# Patient Record
Sex: MALE | Race: ASIAN | NOT HISPANIC OR LATINO | Employment: FULL TIME | URBAN - METROPOLITAN AREA
[De-identification: names, ages, dates, MRNs, and addresses within clinical notes are randomized per-mention and may not be internally consistent; named-entity substitution may affect disease eponyms.]

---

## 2017-05-14 ENCOUNTER — APPOINTMENT (OUTPATIENT)
Dept: CT IMAGING | Facility: CLINIC | Age: 37
End: 2017-05-14
Attending: PHYSICIAN ASSISTANT
Payer: COMMERCIAL

## 2017-05-14 ENCOUNTER — HOSPITAL ENCOUNTER (EMERGENCY)
Facility: CLINIC | Age: 37
Discharge: HOME | End: 2017-05-14
Attending: EMERGENCY MEDICINE | Admitting: EMERGENCY MEDICINE
Payer: COMMERCIAL

## 2017-05-14 VITALS
TEMPERATURE: 97 F | RESPIRATION RATE: 18 BRPM | OXYGEN SATURATION: 99 % | WEIGHT: 128.53 LBS | SYSTOLIC BLOOD PRESSURE: 128 MMHG | DIASTOLIC BLOOD PRESSURE: 87 MMHG

## 2017-05-14 DIAGNOSIS — R10.31 ABDOMINAL PAIN, RIGHT LOWER QUADRANT: ICD-10-CM

## 2017-05-14 DIAGNOSIS — N20.1 URETERIC STONE: ICD-10-CM

## 2017-05-14 DIAGNOSIS — N23 RENAL COLIC: ICD-10-CM

## 2017-05-14 LAB
ALBUMIN SERPL-MCNC: 4.5 G/DL (ref 3.4–5)
ALBUMIN UR-MCNC: NEGATIVE MG/DL
ALP SERPL-CCNC: 87 U/L (ref 40–150)
ALT SERPL W P-5'-P-CCNC: 29 U/L (ref 0–70)
ANION GAP SERPL CALCULATED.3IONS-SCNC: 7 MMOL/L (ref 3–14)
APPEARANCE UR: ABNORMAL
AST SERPL W P-5'-P-CCNC: 18 U/L (ref 0–45)
BASOPHILS # BLD AUTO: 0.1 10E9/L (ref 0–0.2)
BASOPHILS NFR BLD AUTO: 0.5 %
BILIRUB SERPL-MCNC: 0.7 MG/DL (ref 0.2–1.3)
BILIRUB UR QL STRIP: NEGATIVE
BUN SERPL-MCNC: 17 MG/DL (ref 7–30)
CALCIUM SERPL-MCNC: 9 MG/DL (ref 8.5–10.1)
CAOX CRY #/AREA URNS HPF: ABNORMAL /HPF
CHLORIDE SERPL-SCNC: 107 MMOL/L (ref 94–109)
CO2 SERPL-SCNC: 26 MMOL/L (ref 20–32)
COLOR UR AUTO: YELLOW
CREAT SERPL-MCNC: 0.88 MG/DL (ref 0.66–1.25)
DIFFERENTIAL METHOD BLD: NORMAL
EOSINOPHIL # BLD AUTO: 0.1 10E9/L (ref 0–0.7)
EOSINOPHIL NFR BLD AUTO: 1.2 %
ERYTHROCYTE [DISTWIDTH] IN BLOOD BY AUTOMATED COUNT: 12.2 % (ref 10–15)
GFR SERPL CREATININE-BSD FRML MDRD: ABNORMAL ML/MIN/1.7M2
GLUCOSE SERPL-MCNC: 155 MG/DL (ref 70–99)
GLUCOSE UR STRIP-MCNC: NEGATIVE MG/DL
HCT VFR BLD AUTO: 49.2 % (ref 40–53)
HGB BLD-MCNC: 16.3 G/DL (ref 13.3–17.7)
HGB UR QL STRIP: ABNORMAL
IMM GRANULOCYTES # BLD: 0 10E9/L (ref 0–0.4)
IMM GRANULOCYTES NFR BLD: 0.3 %
KETONES UR STRIP-MCNC: NEGATIVE MG/DL
LEUKOCYTE ESTERASE UR QL STRIP: NEGATIVE
LIPASE SERPL-CCNC: 208 U/L (ref 73–393)
LYMPHOCYTES # BLD AUTO: 1.9 10E9/L (ref 0.8–5.3)
LYMPHOCYTES NFR BLD AUTO: 19 %
MCH RBC QN AUTO: 28.7 PG (ref 26.5–33)
MCHC RBC AUTO-ENTMCNC: 33.1 G/DL (ref 31.5–36.5)
MCV RBC AUTO: 87 FL (ref 78–100)
MONOCYTES # BLD AUTO: 0.4 10E9/L (ref 0–1.3)
MONOCYTES NFR BLD AUTO: 3.9 %
MUCOUS THREADS #/AREA URNS LPF: PRESENT /LPF
NEUTROPHILS # BLD AUTO: 7.7 10E9/L (ref 1.6–8.3)
NEUTROPHILS NFR BLD AUTO: 75.1 %
NITRATE UR QL: NEGATIVE
NRBC # BLD AUTO: 0 10*3/UL
NRBC BLD AUTO-RTO: 0 /100
PH UR STRIP: 6 PH (ref 5–7)
PLATELET # BLD AUTO: 235 10E9/L (ref 150–450)
POTASSIUM SERPL-SCNC: 4 MMOL/L (ref 3.4–5.3)
PROT SERPL-MCNC: 7.9 G/DL (ref 6.8–8.8)
RBC # BLD AUTO: 5.67 10E12/L (ref 4.4–5.9)
RBC #/AREA URNS AUTO: 101 /HPF (ref 0–2)
SODIUM SERPL-SCNC: 140 MMOL/L (ref 133–144)
SP GR UR STRIP: 1.01 (ref 1–1.03)
URN SPEC COLLECT METH UR: ABNORMAL
UROBILINOGEN UR STRIP-MCNC: 0 MG/DL (ref 0–2)
WBC # BLD AUTO: 10.2 10E9/L (ref 4–11)
WBC #/AREA URNS AUTO: 1 /HPF (ref 0–2)

## 2017-05-14 PROCEDURE — 99285 EMERGENCY DEPT VISIT HI MDM: CPT | Mod: 25

## 2017-05-14 PROCEDURE — 25000128 H RX IP 250 OP 636: Performed by: EMERGENCY MEDICINE

## 2017-05-14 PROCEDURE — 74177 CT ABD & PELVIS W/CONTRAST: CPT

## 2017-05-14 PROCEDURE — 83690 ASSAY OF LIPASE: CPT | Performed by: EMERGENCY MEDICINE

## 2017-05-14 PROCEDURE — 96361 HYDRATE IV INFUSION ADD-ON: CPT

## 2017-05-14 PROCEDURE — 85025 COMPLETE CBC W/AUTO DIFF WBC: CPT | Performed by: EMERGENCY MEDICINE

## 2017-05-14 PROCEDURE — 25000128 H RX IP 250 OP 636: Performed by: PHYSICIAN ASSISTANT

## 2017-05-14 PROCEDURE — 96374 THER/PROPH/DIAG INJ IV PUSH: CPT | Mod: 59

## 2017-05-14 PROCEDURE — 25500064 ZZH RX 255 OP 636: Performed by: EMERGENCY MEDICINE

## 2017-05-14 PROCEDURE — 80053 COMPREHEN METABOLIC PANEL: CPT | Performed by: EMERGENCY MEDICINE

## 2017-05-14 PROCEDURE — 81001 URINALYSIS AUTO W/SCOPE: CPT | Performed by: PHYSICIAN ASSISTANT

## 2017-05-14 RX ORDER — OXYCODONE HYDROCHLORIDE 5 MG/1
5 TABLET ORAL EVERY 6 HOURS PRN
Qty: 15 TABLET | Refills: 0 | Status: SHIPPED | OUTPATIENT
Start: 2017-05-14 | End: 2017-05-16

## 2017-05-14 RX ORDER — TAMSULOSIN HYDROCHLORIDE 0.4 MG/1
0.4 CAPSULE ORAL DAILY
Qty: 10 CAPSULE | Refills: 0 | Status: SHIPPED | OUTPATIENT
Start: 2017-05-14 | End: 2017-05-16

## 2017-05-14 RX ORDER — LIDOCAINE 40 MG/G
CREAM TOPICAL
Status: DISCONTINUED | OUTPATIENT
Start: 2017-05-14 | End: 2017-05-14 | Stop reason: HOSPADM

## 2017-05-14 RX ORDER — IOPAMIDOL 755 MG/ML
500 INJECTION, SOLUTION INTRAVASCULAR ONCE
Status: COMPLETED | OUTPATIENT
Start: 2017-05-14 | End: 2017-05-14

## 2017-05-14 RX ORDER — KETOROLAC TROMETHAMINE 30 MG/ML
30 INJECTION, SOLUTION INTRAMUSCULAR; INTRAVENOUS ONCE
Status: COMPLETED | OUTPATIENT
Start: 2017-05-14 | End: 2017-05-14

## 2017-05-14 RX ADMIN — SODIUM CHLORIDE 1000 ML: 9 INJECTION, SOLUTION INTRAVENOUS at 07:03

## 2017-05-14 RX ADMIN — IOPAMIDOL 64 ML: 755 INJECTION, SOLUTION INTRAVENOUS at 08:03

## 2017-05-14 RX ADMIN — KETOROLAC TROMETHAMINE 30 MG: 30 INJECTION, SOLUTION INTRAMUSCULAR at 07:03

## 2017-05-14 RX ADMIN — SODIUM CHLORIDE 56 ML: 9 INJECTION, SOLUTION INTRAVENOUS at 08:03

## 2017-05-14 ASSESSMENT — ENCOUNTER SYMPTOMS
ABDOMINAL PAIN: 1
SHORTNESS OF BREATH: 0
COUGH: 0
VOMITING: 0
BACK PAIN: 1
DIARRHEA: 0
NAUSEA: 0
FEVER: 0
BLOOD IN STOOL: 0
CHILLS: 0
DYSURIA: 0

## 2017-05-14 NOTE — ED PROVIDER NOTES
"  History     Chief Complaint:  Abdominal Pain    HPI   Faye Pugh is a 37 year old male who presents with abdominal pain that started three days ago.  He states that it was intermittent until last night when it became constant, and increasing in severity, and now it is radiating to his back.  He denies previous episodes of similar abdominal pain.  History of tiger balm for the pain which comes for about an hour but is no longer helping.  He states his bowel movements have been normal, but \"it doesn't feel like I am going as much as I can\".  He denies any abdominal surgeries.  He does not drink or smoke.  He denies any sick contacts.  He denies fevers, and dysuria. He denies nausea vomiting diarrhea.    Allergies:  No Known Allergies     Medications:      No current outpatient prescriptions on file.    Problem List:    There are no active problems to display for this patient.       Past Medical History:    History reviewed. No pertinent past medical history.    Past Surgical History:      History reviewed. No pertinent surgical history.    Family History:      No family history on file.    Social History:    Marital Status:    Social History   Substance Use Topics     Smoking status: Never Smoker     Smokeless tobacco: Not on file     Alcohol use No        Review of Systems   Constitutional: Negative for chills and fever.   Respiratory: Negative for cough and shortness of breath.    Cardiovascular: Negative for chest pain.   Gastrointestinal: Positive for abdominal pain. Negative for blood in stool, diarrhea, nausea and vomiting.   Genitourinary: Negative for dysuria.   Musculoskeletal: Positive for back pain.   Skin: Negative for rash.   All other systems reviewed and are negative.      Physical Exam   First Vitals:  BP: 135/79  Heart Rate: 75  Temp: 97  F (36.1  C)  Resp: 18  Weight: 58.3 kg (128 lb 8.5 oz)  SpO2: 100 %    Physical Exam  General: Patient is laying in the bed.  Seems to be in some distress. "   Head:  The scalp, face, and head appear normal   Eyes:  The pupils are equal, round, and reactive to light     Extraocular muscles are intact    Conjunctivae and sclerae are normal   CV:  Regular rate and rhythm     Normal S1/S2    No pathological murmur detected   Resp:  Lungs are clear to auscultation    Non-labored    No rales or wheezing   GI:  Abdomen is soft, non-distended    Tenderness to the suprapubic area and right lower quadrant.     Psoas sign and obturator sign negative.    No rebound tenderness     Normal bowel sounds   MS:  Normal muscular tone.  Some tenderness to palpation of the lumbar paraspinal   muscles  Skin:  No rash or acute skin lesions noted   Neuro: Speech is normal and fluent.     Emergency Department Course   Imaging:  CT Abdomen Pelvis w Contrast   Final Result   IMPRESSION: 6 mm stone in the mid left ureter.      EAN PAYNE MD        Laboratory:  Labs Ordered and Resulted from Time of ED Arrival Up to the Time of Departure from the ED   COMPREHENSIVE METABOLIC PANEL - Abnormal; Notable for the following:        Result Value    Glucose 155 (*)     All other components within normal limits   ROUTINE UA WITH MICROSCOPIC - Abnormal; Notable for the following:     Blood Urine Large (*)     RBC Urine 101 (*)     Mucous Urine Present (*)     Calcium Oxalate Few (*)     All other components within normal limits   CBC WITH PLATELETS DIFFERENTIAL   LIPASE   PERIPHERAL IV CATHETER       Interventions:  Medications   lidocaine 1 % 1 mL (not administered)   lidocaine (LMX4) kit (not administered)   sodium chloride (PF) 0.9% PF flush 3 mL (not administered)   sodium chloride (PF) 0.9% PF flush 3 mL (not administered)   ketorolac (TORADOL) injection 30 mg (30 mg Intravenous Given 5/14/17 0703)   0.9% sodium chloride BOLUS (0 mLs Intravenous Stopped 5/14/17 0932)   0.9% sodium chloride BOLUS (0 mLs Intravenous Stopped 5/14/17 0805)   iopamidol (ISOVUE-370) solution 500 mL (64 mLs Intravenous  Given 5/14/17 0803)     Pain is improved with Toradol, patient remains pain-free.    Emergency Department Course:    ED Course:  I reviewed the patient's medical record.   The patient was seen and examined by myself. I discussed the course of care with the patient including laboratory and diagnostic studies.    He understands and is agreeable to the plan.  Recheck. 9:15am, patient remains pain-free.  Urology consulted, Dr. Lerma  I discussed with the patient the results of the above studies and procedures.   He will be discharged to home with a prescription for oxycodone and Flomax, with instructions to take ibuprofen for pain and oxycodone for breakthrough pain.   All questions were answered prior to discharge, and the patient was told to follow up per discharge instructions.    Reasons for return as well as follow up were reviewed with the patient. He understands and agrees to this plan.    Impression & Plan    Medical Decision Making:  Faye Pugh is a 37 year old male who presents with abdominal pain that started three days ago.  Initial concern was for appendicitis so CT scan with contrast was obtained.  Subsequent UA revealed a large amount of red blood cells and calcium oxalate without evidence of infection.  CT scan revealed a 6 m 6 mm m stone in the mid left ureter.  CBC and CMP unremarkable. Given the size of the stone urology was consulted.  Dr. Lerma suggested close follow-up in clinic tomorrow or the next day with [facility] before hand.  The patient will be discharged home with prescription for oxycodone and Flomax with instructions to take scheduled ibuprofen and oxycodone as needed for breakthrough pain.  He is instructed to call the urology number given to him today to set up an appointment for Monday or Tuesday.  He was instructed to return to the ED if he develops pain uncontrolled with pain medication, fever, or other concerning symptoms.  The patient's pain is well-controlled and vitals are stable.   The patient was agreeable to this plan and would like to go home at this time.       Diagnosis:    ICD-10-CM    1. Renal colic N23    2. Abdominal pain, right lower quadrant R10.31    3. Ureteric stone N20.1        Disposition:  Home    Discharge Medications:  Discharge Medication List as of 5/14/2017  9:33 AM      START taking these medications    Details   oxyCODONE (ROXICODONE) 5 MG IR tablet Take 1 tablet (5 mg) by mouth every 6 hours as needed for pain, Disp-15 tablet, R-0, Local Print      tamsulosin (FLOMAX) 0.4 MG capsule Take 1 capsule (0.4 mg) by mouth daily for 10 doses, Disp-10 capsule, R-0, Local Print             Heather Moon  5/14/2017   [facility] EMERGENCY DEPARTMENT       Heather Moon PA-C  05/14/17 0952    See Supervisory Note     Frank Nava MD  05/14/17 9448

## 2017-05-14 NOTE — DISCHARGE INSTRUCTIONS
"   * KIDNEY STONE (w/ Colic)    The sharp cramping pain and nausea/vomiting that you have is due to a small stone which has formed in the kidney and is now passing down a narrow tube (ureter) on its way to your bladder. Once it reaches your bladder, the pain will stop. The stone may pass in your urine stream in one piece. [The size may be 1/16\" to 1/4\" (1-6mm)]. Or, the stone may also break up into valentina fragments which you may not even notice.  Once you have had a kidney stone there is a risk for recurrence in the future.  HOME CARE:    Drink lots of fluid (at least 8-10 glasses of water a day).    Most stones will pass on their own, but may take from a few hours to a few days.    Each time you urinate, do so in a jar. Pour the urine from the jar through the strainer and into the toilet. Continue doing this until 24 hours after your pain stops. By then, if there was a kidney stone, it should pass from your bladder. Some stones dissolve into sand-like particles and pass right through the strainer. In that case, you won't ever see a stone.    Save any stone that you find in the strainer and bring it to your doctor for analysis. It may be possible to prevent certain types of stones from forming. Therefore, it is important to know what kind of stone you have.    Try to stay as active as possible since this will help the stone pass. Do not stay in bed unless your pain prevents you from getting up. You may notice a red, pink or brown color to your urine. This is normal while passing a kidney stone.  FOLLOW UP with your doctor or return to this facility if the pain lasts more than 48 hours.  GET PROMPT MEDICAL ATTENTION if any of the following occur:    Pain that is not controlled by the medicine given    Repeated vomiting or unable to keep down fluids    Weakness, dizziness or fainting    Fever over 101  F (38.3  C)    Passage of solid red or brown urine (can't see through it) or urine with lots of blood clots    Unable " to pass urine for 8 hours and increasing bladder pressure    [id number] [employer], [address] [facility] [address], [place] [address]. All rights reserved. This information is not intended as a substitute for professional medical care. Always follow your healthcare professional's instructions.    You were diagnosed today with a kidney stone.  It is quite large so he would like you to follow up with urology tomorrow or Tuesday to ensure that the stone is passing appropriately.  Take ibuprofen for pain and oxycodone for breakthrough pain.  Please return if you develop increasing pain, fever or any other concerning symptoms.  Otherwise follow urology instructions.

## 2017-05-14 NOTE — ED NOTES
Emergency Department Attending Supervision Note  5/14/2017  10:25 AM      I evaluated this patient in conjunction with Lisette ANDREA      Briefly, the patient presented with intermittent lower abdominal pain and mild back pain.  He had no trauma. Thought he was constipated but had BM yesterday.  Nausea no vomiting or bloody stools.  No fever or other complaints.      On my exam, patient had vague discomfort of the suprapubic and periumbilical area no peritoneal signs.  Normal respirations. Normal HR.  Appears comfortable at the time of my exam    My impression is left ureterolithiasis.  Close FU with Urology based on the size and location of the stone.      Diagnosis    ICD-10-CM    1. Renal colic N23    2. Abdominal pain, right lower quadrant R10.31    3. Ureteric stone N20.1          Frank Quick MD  05/14/17 1030

## 2017-05-14 NOTE — ED NOTES
All patient questions have been answered, no further questions at this time. Patient verbalizes understanding of discharge teaching, medications and the importance of follow up. Patient was also given urine strainer and instructions on use.

## 2017-05-14 NOTE — ED AVS SNAPSHOT
[facility] Emergency Department    [address]    [facility] [address]    Phone:  [phone]    Fax:  [phone]                                       Faye Pugh   MRN: [id number]    Department:  [facility] Emergency Department   Date of Visit:  5/14/2017           After Visit Summary Signature Page     I have received my discharge instructions, and my questions have been answered. I have discussed any challenges I see with this plan with the nurse or doctor.    ..........................................................................................................................................  Patient/Patient Representative Signature      ..........................................................................................................................................  Patient Representative Print Name and Relationship to Patient    ..................................................               ................................................  Date                                            Time    ..........................................................................................................................................  Reviewed by Signature/Title    ...................................................              ..............................................  Date                                                            Time

## 2017-05-14 NOTE — ED NOTES
Alert and oriented x 3 airway,breathing and circulation intact, abd pain for 3 days, no vomiting or diarrhea, pain around umbilicus

## 2017-05-14 NOTE — ED AVS SNAPSHOT
" [facility] Emergency Department    [address]    [facility] [address]    Phone:  [phone]    Fax:  [phone]                                       Faye Pugh   MRN: [id number]    Department:  [facility] Emergency Department   Date of Visit:  5/14/2017           Patient Information     Date Of Birth          1980        Your diagnoses for this visit were:     Renal colic     Abdominal pain, right lower quadrant        You were seen by Frank Nava MD.      Follow-up Information     Follow up with [facility] Emergency Department.    Specialty:  EMERGENCY MEDICINE    Why:  If symptoms worsen    Contact information:    [address]  [facility] [address] [phone]        Follow up with Yogesh Velazquez MD In 1 day.    Specialty:  Urology    Why:  for stone passage    Contact information:    [facility] UROLOGY  [address]  [facility] [address]  [phone]          Discharge Instructions          * KIDNEY STONE (w/ Colic)    The sharp cramping pain and nausea/vomiting that you have is due to a small stone which has formed in the kidney and is now passing down a narrow tube (ureter) on its way to your bladder. Once it reaches your bladder, the pain will stop. The stone may pass in your urine stream in one piece. [The size may be 1/16\" to 1/4\" (1-6mm)]. Or, the stone may also break up into valentina fragments which you may not even notice.  Once you have had a kidney stone there is a risk for recurrence in the future.  HOME CARE:    Drink lots of fluid (at least 8-10 glasses of water a day).    Most stones will pass on their own, but may take from a few hours to a few days.    Each time you urinate, do so in a jar. Pour the urine from the jar through the strainer and into the toilet. Continue doing this until 24 hours after your pain stops. By then, if there was a kidney stone, it should pass from your bladder. Some " stones dissolve into sand-like particles and pass right through the strainer. In that case, you won't ever see a stone.    Save any stone that you find in the strainer and bring it to your doctor for analysis. It may be possible to prevent certain types of stones from forming. Therefore, it is important to know what kind of stone you have.    Try to stay as active as possible since this will help the stone pass. Do not stay in bed unless your pain prevents you from getting up. You may notice a red, pink or brown color to your urine. This is normal while passing a kidney stone.  FOLLOW UP with your doctor or return to this facility if the pain lasts more than 48 hours.  GET PROMPT MEDICAL ATTENTION if any of the following occur:    Pain that is not controlled by the medicine given    Repeated vomiting or unable to keep down fluids    Weakness, dizziness or fainting    Fever over 101  F (38.3  C)    Passage of solid red or brown urine (can't see through it) or urine with lots of blood clots    Unable to pass urine for 8 hours and increasing bladder pressure    [address]64[address]44[address] [place] [address]. All rights reserved. This information is not intended as a substitute for professional medical care. Always follow your healthcare professional's instructions.    You were diagnosed today with a kidney stone.  It is quite large so he would like you to follow up with urology tomorrow or Tuesday to ensure that the stone is passing appropriately.  Take ibuprofen for pain and oxycodone for breakthrough pain.  Please return if you develop increasing pain, fever or any other concerning symptoms.  Otherwise follow urology instructions.    24 Hour Appointment Hotline       To make an appointment at any [facility] clinic, call [phone] ([phone]). If you don't have a family doctor or clinic, we will help you find one. [facility] clinics are conveniently located to serve the needs of you and  your family.             Review of your medicines      START taking        Dose / Directions Last dose taken    oxyCODONE 5 MG IR tablet   Commonly known as:  ROXICODONE   Dose:  5 mg   Quantity:  15 tablet        Take 1 tablet (5 mg) by mouth every 6 hours as needed for pain   Refills:  0        tamsulosin 0.4 MG capsule   Commonly known as:  FLOMAX   Dose:  0.4 mg   Quantity:  10 capsule        Take 1 capsule (0.4 mg) by mouth daily for 10 doses   Refills:  0                Prescriptions were sent or printed at these locations (2 Prescriptions)                   Other Prescriptions                Printed at Department/Unit printer (2 of 2)         oxyCODONE (ROXICODONE) 5 MG IR tablet               tamsulosin (FLOMAX) 0.4 MG capsule                Procedures and tests performed during your visit     CBC with platelets differential    CT Abdomen Pelvis w Contrast    Comprehensive metabolic panel    Lipase    UA with Microscopic      Orders Needing Specimen Collection     None      Pending Results     No orders found from 5/12/2017 to 5/15/2017.            Pending Culture Results     No orders found from 5/12/2017 to 5/15/2017.            Pending Results Instructions     If you had any lab results that were not finalized at the time of your Discharge, you can call the ED Lab Result RN at [phone]. You will be contacted by this team for any positive Lab results or changes in treatment. The nurses are available 7 days a week from 10A to 6:30P.  You can leave a message 24 hours per day and they will return your call.        Test Results From Your Hospital Stay        5/14/2017  7:16 AM      Component Results     Component Value Ref Range & Units Status    WBC 10.2 4.0 - 11.0 10e9/L Final    RBC Count 5.67 4.4 - 5.9 10e12/L Final    Hemoglobin 16.3 13.3 - 17.7 g/dL Final    Hematocrit 49.2 40.0 - 53.0 % Final    MCV 87 78 - 100 fl Final    MCH 28.7 26.5 - 33.0 pg Final    MCHC 33.1 31.5 - 36.5 g/dL Final    RDW 12.2  10.0 - 15.0 % Final    Platelet Count 235 150 - 450 10e9/L Final    Diff Method Automated Method  Final    % Neutrophils 75.1 % Final    % Lymphocytes 19.0 % Final    % Monocytes 3.9 % Final    % Eosinophils 1.2 % Final    % Basophils 0.5 % Final    % Immature Granulocytes 0.3 % Final    Nucleated RBCs 0 0 /100 Final    Absolute Neutrophil 7.7 1.6 - 8.3 10e9/L Final    Absolute Lymphocytes 1.9 0.8 - 5.3 10e9/L Final    Absolute Monocytes 0.4 0.0 - 1.3 10e9/L Final    Absolute Eosinophils 0.1 0.0 - 0.7 10e9/L Final    Absolute Basophils 0.1 0.0 - 0.2 10e9/L Final    Abs Immature Granulocytes 0.0 0 - 0.4 10e9/L Final    Absolute Nucleated RBC 0.0  Final         5/14/2017  7:36 AM      Component Results     Component Value Ref Range & Units Status    Sodium 140 133 - 144 mmol/L Final    Potassium 4.0 3.4 - 5.3 mmol/L Final    Chloride 107 94 - 109 mmol/L Final    Carbon Dioxide 26 20 - 32 mmol/L Final    Anion Gap 7 3 - 14 mmol/L Final    Glucose 155 (H) 70 - 99 mg/dL Final    Urea Nitrogen 17 7 - 30 mg/dL Final    Creatinine 0.88 0.66 - 1.25 mg/dL Final    GFR Estimate >90  Non  GFR Calc   >60 mL/min/1.7m2 Final    GFR Estimate If Black >90   GFR Calc   >60 mL/min/1.7m2 Final    Calcium 9.0 8.5 - 10.1 mg/dL Final    Bilirubin Total 0.7 0.2 - 1.3 mg/dL Final    Albumin 4.5 3.4 - 5.0 g/dL Final    Protein Total 7.9 6.8 - 8.8 g/dL Final    Alkaline Phosphatase 87 40 - 150 U/L Final    ALT 29 0 - 70 U/L Final    AST 18 0 - 45 U/L Final         5/14/2017  7:36 AM      Component Results     Component Value Ref Range & Units Status    Lipase 208 73 - 393 U/L Final         5/14/2017  8:16 AM      Component Results     Component Value Ref Range & Units Status    Color Urine Yellow  Final    Appearance Urine Slightly Cloudy  Final    Glucose Urine Negative NEG mg/dL Final    Bilirubin Urine Negative NEG Final    Ketones Urine Negative NEG mg/dL Final    Specific Gravity Urine 1.013 1.003 - 1.035  Final    Blood Urine Large (A) NEG Final    pH Urine 6.0 5.0 - 7.0 pH Final    Protein Albumin Urine Negative NEG mg/dL Final    Urobilinogen mg/dL 0.0 0.0 - 2.0 mg/dL Final    Nitrite Urine Negative NEG Final    Leukocyte Esterase Urine Negative NEG Final    Source Midstream Urine  Final    WBC Urine 1 0 - 2 /HPF Final    RBC Urine 101 (H) 0 - 2 /HPF Final    Mucous Urine Present (A) NEG /LPF Final    Calcium Oxalate Few (A) NEG /HPF Final         5/14/2017  9:09 AM      Narrative     CT ABDOMEN AND PELVIS WITH CONTRAST 5/14/2017 8:07 AM     COMPARISON: None.    HISTORY: Abdominal pain.    TECHNIQUE: Volumetric helical acquisition of CT images from the lung  bases through the symphysis pubis after the administration of 64 mL of  Isovue 370 intravenous  contrast. Radiation dose for this scan was  reduced using automated exposure control, adjustment of the mA and/or  kV according to patient size, or iterative reconstruction technique.    FINDINGS: The lung bases are clear. The liver, spleen, pancreas, and  adrenal glands are unremarkable. 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 There is a 6 mm stone in the mid left ureter,  seen on image 51. The proximal ureter is mildly dilated. There is  minimal left hydronephrosis. The small and large bowel are normal in  caliber. Appendix is normal. Moderate amount of stool throughout the  colon. No evidence of ascites or free intraperitoneal air.         Impression     IMPRESSION: 6 mm stone in the mid left ureter.    EAN PAYNE MD                Clinical Quality Measure: Blood Pressure Screening     Your blood pressure was checked while you were in the emergency department today. The last reading we obtained was  BP: (!) 132/92 . Please read the guidelines below about what these numbers mean and what you should do about them.  If your systolic blood pressure (the top number) is less than 120 and your diastolic blood pressure (the bottom number) is less  "than 80, then your blood pressure is normal. There is nothing more that you need to do about it.  If your systolic blood pressure (the top number) is 120-139 or your diastolic blood pressure (the bottom number) is 80-89, your blood pressure may be higher than it should be. You should have your blood pressure rechecked within a year by a primary care provider.  If your systolic blood pressure (the top number) is 140 or greater or your diastolic blood pressure (the bottom number) is 90 or greater, you may have high blood pressure. High blood pressure is treatable, but if left untreated over time it can put you at risk for heart attack, stroke, or kidney failure. You should have your blood pressure rechecked by a primary care provider within the next 4 weeks.  If your provider in the emergency department today gave you specific instructions to follow-up with your doctor or provider even sooner than that, you should follow that instruction and not wait for up to 4 weeks for your follow-up visit.        Thank you for choosing [facility]       Thank you for choosing [facility] for your care. Our goal is always to provide you with excellent care. Hearing back from our patients is one way we can continue to improve our services. Please take a few minutes to complete the written survey that you may receive in the mail after you visit with us. Thank you!        [email or web address] Information     [email or web address] lets you send messages to your doctor, view your test results, renew your prescriptions, schedule appointments and more. To sign up, go to [email or web address] . Click on \"Log in\" on the left side of the screen, which will take you to the Welcome page. Then click on \"Sign up Now\" on the right side of the page.     You will be asked to enter the access code listed below, as well as some personal information. Please follow the directions to create your username and password.     Your access code is: [email or web address]  Expires: 8/12/2017  9:32 " AM     Your access code will  in 90 days. If you need help or a new code, please call your [place] clinic or [phone].        Care EveryWhere ID     This is your Care EveryWhere ID. This could be used by other organizations to access your [place] medical records  [id number]        After Visit Summary       This is your record. Keep this with you and show to your community pharmacist(s) and doctor(s) at your next visit.

## 2017-05-16 ENCOUNTER — OFFICE VISIT (OUTPATIENT)
Dept: UROLOGY | Facility: CLINIC | Age: 37
End: 2017-05-16
Payer: COMMERCIAL

## 2017-05-16 VITALS
BODY MASS INDEX: 22.66 KG/M2 | SYSTOLIC BLOOD PRESSURE: 140 MMHG | WEIGHT: 127.87 LBS | DIASTOLIC BLOOD PRESSURE: 82 MMHG | HEART RATE: 80 BPM | HEIGHT: 63 IN

## 2017-05-16 DIAGNOSIS — N20.0 KIDNEY STONE: Primary | ICD-10-CM

## 2017-05-16 LAB
ALBUMIN UR-MCNC: NEGATIVE MG/DL
APPEARANCE UR: CLEAR
BILIRUB UR QL STRIP: NEGATIVE
COLOR UR AUTO: YELLOW
GLUCOSE UR STRIP-MCNC: NEGATIVE MG/DL
HGB UR QL STRIP: ABNORMAL
KETONES UR STRIP-MCNC: NEGATIVE MG/DL
LEUKOCYTE ESTERASE UR QL STRIP: NEGATIVE
NITRATE UR QL: NEGATIVE
PH UR STRIP: 6 PH (ref 5–7)
SP GR UR STRIP: 1.01 (ref 1–1.03)
URN SPEC COLLECT METH UR: ABNORMAL
UROBILINOGEN UR STRIP-ACNC: 0.2 EU/DL (ref 0.2–1)

## 2017-05-16 PROCEDURE — 81003 URINALYSIS AUTO W/O SCOPE: CPT | Performed by: UROLOGY

## 2017-05-16 PROCEDURE — 99203 OFFICE O/P NEW LOW 30 MIN: CPT | Performed by: UROLOGY

## 2017-05-16 RX ORDER — TAMSULOSIN HYDROCHLORIDE 0.4 MG/1
0.4 CAPSULE ORAL DAILY
Qty: 30 CAPSULE | Refills: 0 | Status: ON HOLD | OUTPATIENT
Start: 2017-05-16 | End: 2017-06-15

## 2017-05-16 RX ORDER — IBUPROFEN 200 MG
TABLET ORAL
COMMUNITY
Start: 2017-05-14 | End: 2017-07-18

## 2017-05-16 RX ORDER — OXYCODONE HYDROCHLORIDE 5 MG/1
5 TABLET ORAL EVERY 6 HOURS PRN
Qty: 15 TABLET | Refills: 0 | Status: ON HOLD | OUTPATIENT
Start: 2017-05-16 | End: 2017-06-15

## 2017-05-16 ASSESSMENT — ENCOUNTER SYMPTOMS
HEMATURIA: 0
DYSURIA: 1
FLANK PAIN: 1
DIFFICULTY URINATING: 0

## 2017-05-16 ASSESSMENT — PAIN SCALES - GENERAL: PAINLEVEL: MILD PAIN (2)

## 2017-05-16 NOTE — MR AVS SNAPSHOT
After Visit Summary   5/16/2017    Faye Pugh    MRN: [id number]           Patient Information     Date Of Birth          1980        Visit Information        Provider Department      5/16/2017 4:10 PM Amanda Martinez MD [facility] [place]        Today's Diagnoses     Kidney stone    -  1       Follow-ups after your visit        Follow-up notes from your care team     Return in about 2 weeks (around 5/30/2017).      Your next 10 appointments already scheduled     May 30, 2017  2:30 PM CDT   CT ABDOMEN PELVIS W/O CONTRAST with SHCT1   [facility] ([facility])    [address] [id number] [phone]           Please bring any scans or X-rays taken at other hospitals, if similar tests were done. Also bring a list of your medicines, including vitamins, minerals and over-the-counter drugs. It is safest to leave personal items at home.  Be sure to tell your doctor:   If you have any allergies.   If there s any chance you are pregnant.   If you are breastfeeding.   If you have any special needs.  You do not need to do anything special to prepare.  Please wear loose clothing, such as a sweat suit or jogging clothes. Avoid snaps, zippers and other metal. We may ask you to undress and put on a hospital gown.            May 30, 2017  3:20 PM CDT   Return Visit with Amanda Martinez MD   [facility] [place] ([facility])    [address]  [facility] [address] [phone]              Future tests that were ordered for you today     Open Future Orders        Priority Expected Expires Ordered    CT Abdomen Pelvis w/o Contrast [LBC050] Routine  5/16/2018 5/16/2017            Who to contact     If you have questions or need follow up information about today's clinic visit or your schedule please contact [facility]  "[place] directly at [phone].  Normal or non-critical lab and imaging results will be communicated to you by MyChart, letter or phone within 4 business days after the clinic has received the results. If you do not hear from us within 7 days, please contact the clinic through [employer] or phone. If you have a critical or abnormal lab result, we will notify you by phone as soon as possible.  Submit refill requests through [employer] or call your pharmacy and they will forward the refill request to us. Please allow 3 business days for your refill to be completed.          Additional Information About Your Visit        [employer] Information     [employer] gives you secure access to your electronic health record. If you see a primary care provider, you can also send messages to your care team and make appointments. If you have questions, please call your primary care clinic.  If you do not have a primary care provider, please call [phone] and they will assist you.        Care EveryWhere ID     This is your Care EveryWhere ID. This could be used by other organizations to access your [place] medical records  [id number]        Your Vitals Were     Pulse Height BMI (Body Mass Index)             80 1.6 m (5' 3\") 22.65 kg/m2          Blood Pressure from Last 3 Encounters:   05/16/17 140/82   05/14/17 128/87    Weight from Last 3 Encounters:   05/16/17 58 kg (127 lb 13.9 oz)   05/14/17 58.3 kg (128 lb 8.5 oz)              We Performed the Following     UA without Microscopic          Where to get your medicines      These medications were sent to [facility] [address] [facility] [address] AT [facility] [address]  [address], [place] [address]     Phone:  [phone]     tamsulosin 0.4 MG capsule         Some of these will need a paper prescription and others can be bought over the counter.  Ask your nurse if you have questions.     Bring a paper prescription for each of " these medications     oxyCODONE 5 MG IR tablet          Primary Care Provider Office Phone # Fax #    Vincent Brenner [phone]       [facility] [place] [address] [place] [address]        Thank you!     Thank you for choosing [facility] [place]  for your care. Our goal is always to provide you with excellent care. Hearing back from our patients is one way we can continue to improve our services. Please take a few minutes to complete the written survey that you may receive in the mail after your visit with us. Thank you!             Your Updated Medication List - Protect others around you: Learn how to safely use, store and throw away your medicines at www.disposemymeds.org.          This list is accurate as of: 5/16/17  5:58 PM.  Always use your most recent med list.                   Brand Name Dispense Instructions for use    ibuprofen 200 MG tablet    ADVIL/MOTRIN         oxyCODONE 5 MG IR tablet    ROXICODONE    15 tablet    Take 1 tablet (5 mg) by mouth every 6 hours as needed for pain       tamsulosin 0.4 MG capsule    FLOMAX    30 capsule    Take 1 capsule (0.4 mg) by mouth daily

## 2017-05-16 NOTE — PROGRESS NOTES
"  SUBJECTIVE:                                                      Faye Pugh is a 37 year old male who comes in for problems related to kidney stones.    Patient presents for evaluation and management of left distal ureteral calculus.   Patient presented to the ED with pain. Found on CT scan to have a ~ 6mm ureteral calculus in the distal left ureter.   He notes that his pain is controlled with scheduled ibuprofen and some oxycodone.       We discussed stone passage rates, MET, URS.           ROS:  CONSTITUTIONAL:NEGATIVE for fever, chills, change in weight  INTEGUMENTARY/SKIN: NEGATIVE for worrisome rashes, moles or lesions  EYES: NEGATIVE for vision changes or irritation  ENT/MOUTH: NEGATIVE for ear, mouth and throat problems  RESP:NEGATIVE for significant cough or SOB  CV: NEGATIVE for chest pain, palpitations or peripheral edema  GI: NEGATIVE for nausea, abdominal pain, heartburn, or change in bowel habits  MUSCULOSKELETAL: NEGATIVE for significant arthralgias or myalgia  PSYCHIATRIC: NEGATIVE for changes in mood or affect    No past medical history on file.    No past surgical history on file.    No family history on file.    Social History   Substance Use Topics     Smoking status: Never Smoker     Smokeless tobacco: Never Used     Alcohol use No         OBJECTIVE:                                                    /82  Pulse 80  Ht 1.6 m (5' 3\")  Wt 58 kg (127 lb 13.9 oz)  BMI 22.65 kg/m2  Body mass index is 22.65 kg/(m^2).    EXAM:        GENERAL APPEARANCE: healthy, alert and no distress  RESP: negative   CV: negative   Abdomen: Abdomen soft, non-tender  CVAT: absent  SKIN: no suspicious lesions or rashes    IMPRESSION: Patient with left distal ureteral calculus, ~ 6mm         Diagnostic test results:  CT scan reviewed with the patient      ASSESSMENT/PLAN:                                                        ICD-10-CM    1. Kidney stone N20.0 UA without Microscopic     oxyCODONE (ROXICODONE) " 5 MG IR tablet     tamsulosin (FLOMAX) 0.4 MG capsule     CT Abdomen Pelvis w/o Contrast [AQM695]       Will have trial of MET, rx for flomax and oxycodone   Repeat CT scan in two weeks   Follow up in two weeks  Will consider URS if no stone passage in two weeks     Amanda Martinez MD  [facility] [place] spent over 30 minutes with the patient.  Over half this time was spent on counseling for ureteral calculus.      Answers for HPI/ROS submitted by the patient on 5/16/2017   General Symptoms: No  Skin Symptoms: No  HENT Symptoms: No  EYE SYMPTOMS: No  HEART SYMPTOMS: No  LUNG SYMPTOMS: No  INTESTINAL SYMPTOMS: No  URINARY SYMPTOMS: Yes  REPRODUCTIVE SYMPTOMS: No  SKELETAL SYMPTOMS: No  BLOOD SYMPTOMS: No  NERVOUS SYSTEM SYMPTOMS: No  MENTAL HEALTH SYMPTOMS: No  Trouble holding urine or incontinence: No  Pain or burning: Yes  Trouble starting or stopping: No  Increased frequency of urination: Yes  Blood in urine: No  Decreased frequency of urination: No  Frequent nighttime urination: No  Flank pain: Yes  Difficulty emptying bladder: No

## 2017-05-16 NOTE — NURSING NOTE
Chief Complaint   Patient presents with     Kidney Stone Related     Patient is here for a stone      Meagan Go LPN 4:19 PM May 16, 2017

## 2017-05-16 NOTE — LETTER
"5/16/2017       RE: Faye Pugh  [address]  [facility] [address]     Dear Colleague,    Thank you for referring your patient, Faye Pugh, to the [facility] [place] [facility]. Please see a copy of my visit note below.      SUBJECTIVE:                                                      Faye Pugh is a 37 year old male who comes in for problems related to kidney stones.    Patient presents for evaluation and management of left distal ureteral calculus.   Patient presented to the ED with pain. Found on CT scan to have a ~ 6mm ureteral calculus in the distal left ureter.   He notes that his pain is controlled with scheduled ibuprofen and some oxycodone.       We discussed stone passage rates, MET, URS.       ROS:  CONSTITUTIONAL:NEGATIVE for fever, chills, change in weight  INTEGUMENTARY/SKIN: NEGATIVE for worrisome rashes, moles or lesions  EYES: NEGATIVE for vision changes or irritation  ENT/MOUTH: NEGATIVE for ear, mouth and throat problems  RESP:NEGATIVE for significant cough or SOB  CV: NEGATIVE for chest pain, palpitations or peripheral edema  GI: NEGATIVE for nausea, abdominal pain, heartburn, or change in bowel habits  MUSCULOSKELETAL: NEGATIVE for significant arthralgias or myalgia  PSYCHIATRIC: NEGATIVE for changes in mood or affect    No past medical history on file.    No past surgical history on file.    No family history on file.    Social History   Substance Use Topics     Smoking status: Never Smoker     Smokeless tobacco: Never Used     Alcohol use No         OBJECTIVE:                                                    /82  Pulse 80  Ht 1.6 m (5' 3\")  Wt 58 kg (127 lb 13.9 oz)  BMI 22.65 kg/m2  Body mass index is 22.65 kg/(m^2).    EXAM:        GENERAL APPEARANCE: healthy, alert and no distress  RESP: negative   CV: negative   Abdomen: Abdomen soft, non-tender  CVAT: absent  SKIN: no suspicious lesions or " [place]    IMPRESSION: Patient with left distal ureteral calculus, ~ 6mm         Diagnostic test results:  CT scan reviewed with the patient      ASSESSMENT/PLAN:                                                        ICD-10-CM    1. Kidney stone N20.0 UA without Microscopic     oxyCODONE (ROXICODONE) 5 MG IR tablet     tamsulosin (FLOMAX) 0.4 MG capsule     CT Abdomen Pelvis w/o Contrast [KDJ110]       Will have trial of MET, rx for flomax and oxycodone   Repeat CT scan in two weeks   Follow up in two weeks  Will consider URS if no stone passage in two weeks     Amanda Martinez MD  [facility] [place]    I spent over 30 minutes with the patient.  Over half this time was spent on counseling for ureteral calculus.      Again, thank you for allowing me to participate in the care of your patient.      Sincerely,    Amanda Martinez MD

## 2017-05-26 DIAGNOSIS — N20.0 KIDNEY STONE: Primary | ICD-10-CM

## 2017-05-30 ENCOUNTER — OFFICE VISIT (OUTPATIENT)
Dept: UROLOGY | Facility: CLINIC | Age: 37
End: 2017-05-30
Payer: COMMERCIAL

## 2017-05-30 ENCOUNTER — HOSPITAL ENCOUNTER (OUTPATIENT)
Dept: CT IMAGING | Facility: CLINIC | Age: 37
Discharge: HOME | End: 2017-05-30
Attending: UROLOGY | Admitting: UROLOGY
Payer: COMMERCIAL

## 2017-05-30 VITALS
DIASTOLIC BLOOD PRESSURE: 70 MMHG | HEART RATE: 87 BPM | HEIGHT: 63 IN | WEIGHT: 127 LBS | BODY MASS INDEX: 22.5 KG/M2 | SYSTOLIC BLOOD PRESSURE: 110 MMHG | OXYGEN SATURATION: 98 %

## 2017-05-30 DIAGNOSIS — N20.1 CALCULUS OF URETER: Primary | ICD-10-CM

## 2017-05-30 DIAGNOSIS — N20.0 KIDNEY STONE: ICD-10-CM

## 2017-05-30 LAB
ALBUMIN UR-MCNC: NEGATIVE MG/DL
APPEARANCE UR: CLEAR
BILIRUB UR QL STRIP: NEGATIVE
COLOR UR AUTO: YELLOW
GLUCOSE UR STRIP-MCNC: NEGATIVE MG/DL
HGB UR QL STRIP: NEGATIVE
KETONES UR STRIP-MCNC: NEGATIVE MG/DL
LEUKOCYTE ESTERASE UR QL STRIP: NEGATIVE
NITRATE UR QL: NEGATIVE
PH UR STRIP: 7 PH (ref 5–7)
SP GR UR STRIP: 1.01 (ref 1–1.03)
URN SPEC COLLECT METH UR: NORMAL
UROBILINOGEN UR STRIP-ACNC: 0.2 EU/DL (ref 0.2–1)

## 2017-05-30 PROCEDURE — 81003 URINALYSIS AUTO W/O SCOPE: CPT | Performed by: UROLOGY

## 2017-05-30 PROCEDURE — 99213 OFFICE O/P EST LOW 20 MIN: CPT | Performed by: UROLOGY

## 2017-05-30 PROCEDURE — 74176 CT ABD & PELVIS W/O CONTRAST: CPT

## 2017-05-30 PROCEDURE — 87086 URINE CULTURE/COLONY COUNT: CPT | Performed by: UROLOGY

## 2017-05-30 ASSESSMENT — PAIN SCALES - GENERAL: PAINLEVEL: NO PAIN (0)

## 2017-05-30 NOTE — MR AVS SNAPSHOT
After Visit Summary   5/30/2017    Faye Pugh    MRN: [id number]           Patient Information     Date Of Birth          1980        Visit Information        Provider Department      5/30/2017 3:20 PM Amanda Martinez MD [facility] Urology [facility]         Follow-ups after your visit        Your next 10 appointments already scheduled     May 30, 2017  2:30 PM CDT   CT ABDOMEN PELVIS W/O CONTRAST with SHCT1   [facility] ([facility])    [address] [id number] [phone]           Please bring any scans or X-rays taken at other hospitals, if similar tests were done. Also bring a list of your medicines, including vitamins, minerals and over-the-counter drugs. It is safest to leave personal items at home.  Be sure to tell your doctor:   If you have any allergies.   If there s any chance you are pregnant.   If you are breastfeeding.   If you have any special needs.  You do not need to do anything special to prepare.  Please wear loose clothing, such as a sweat suit or jogging clothes. Avoid snaps, zippers and other metal. We may ask you to undress and put on a hospital gown.            May 30, 2017  3:20 PM CDT   Return Visit with Amanda Martinez MD   [facility] Urology [facility] ([facility])    [address]  [address]  [place] [id number] [phone]              Future tests that were ordered for you today     Open Future Orders        Priority Expected Expires Ordered    CT Abdomen Pelvis w/o Contrast [[id number]] Routine  5/16/2018 5/16/2017            Who to contact     If you have questions or need follow up information about today's clinic visit or your schedule please contact [facility] UROLOGY [facility] directly at [phone].  Normal or non-critical lab and imaging results will be communicated to you by MyChart, letter or phone  within 4 business days after the clinic has received the results. If you do not hear from us within 7 days, please contact the clinic through [employer] or phone. If you have a critical or abnormal lab result, we will notify you by phone as soon as possible.  Submit refill requests through [employer] or call your pharmacy and they will forward the refill request to us. Please allow 3 business days for your refill to be completed.          Additional Information About Your Visit        [employer] Information     [employer] gives you secure access to your electronic health record. If you see a primary care provider, you can also send messages to your care team and make appointments. If you have questions, please call your primary care clinic.  If you do not have a primary care provider, please call [phone] and they will assist you.        Care EveryWhere ID     This is your Care EveryWhere ID. This could be used by other organizations to access your [place] medical records  [id number]         Blood Pressure from Last 3 Encounters:   05/16/17 140/82   05/14/17 128/87    Weight from Last 3 Encounters:   05/16/17 58 kg (127 lb 13.9 oz)   05/14/17 58.3 kg (128 lb 8.5 oz)              Today, you had the following     No orders found for display       Primary Care Provider Office Phone # Fax #    Vincent MARITO Brenner [phone]       [facility] [place] [address] [place] [address]        Thank you!     Thank you for choosing [facility] UROLOGY [facility]  for your care. Our goal is always to provide you with excellent care. Hearing back from our patients is one way we can continue to improve our services. Please take a few minutes to complete the written survey that you may receive in the mail after your visit with us. Thank you!             Your Updated Medication List - Protect others around you: Learn how to safely use, store and throw away your medicines at  www.disposemymeds.org.          This list is accurate as of: 5/16/17  4:51 PM.  Always use your most recent med list.                   Brand Name Dispense Instructions for use    ibuprofen 200 MG tablet    ADVIL/MOTRIN         oxyCODONE 5 MG IR tablet    ROXICODONE    15 tablet    Take 1 tablet (5 mg) by mouth every 6 hours as needed for pain       tamsulosin 0.4 MG capsule    FLOMAX    30 capsule    Take 1 capsule (0.4 mg) by mouth daily

## 2017-05-30 NOTE — PROGRESS NOTES
"UROLOGIC DIAGNOSES:       CURRENT INTERVENTIONS:       HISTORY:     Patient presents for evaluation and management of left distal ureteral calculus.   Patient presented to the ED with pain on 05/14/2017. Found on CT scan to have a ~ 6mm ureteral calculus in the distal left ureter.   He notes that his pain is controlled with scheduled ibuprofen and some oxycodone, but he has not had pain over the last five days.     Patient had CT scan today that showed progression of the stone to the distal ureter (nearly UVJ).   We discussed further MET vs URS.        PAST MEDICAL HISTORY: No past medical history on file.    PAST SURGICAL HISTORY: No past surgical history on file.    FAMILY HISTORY: No family history on file.    SOCIAL HISTORY:   Social History   Substance Use Topics     Smoking status: Never Smoker     Smokeless tobacco: Never Used     Alcohol use No       Current Outpatient Prescriptions   Medication     ibuprofen (ADVIL/MOTRIN) 200 MG tablet     oxyCODONE (ROXICODONE) 5 MG IR tablet     tamsulosin (FLOMAX) 0.4 MG capsule     No current facility-administered medications for this visit.          PHYSICAL EXAM:    /70 (BP Location: Left arm, Patient Position: Chair, Cuff Size: Adult Regular)  Pulse 87  Ht 1.6 m (5' 3\")  Wt 57.6 kg (127 lb)  SpO2 98%  BMI 22.5 kg/m2    HEENT: Normocephalic and atraumatic   Cardiac: Not done  Back/Flank: Not done  CNS/PNS: Not done  Respiratory: Normal non-labored breathing  Abdomen: Soft nontender and nondistended  Peripheral Vascular: Not done  Mental Status: Not done    Penis: Not done  Scrotal Skin: Not done  Testicles: Not done  Epididymis: Not done  Digital Rectal Exam:     Cystoscopy: Not done    Imaging: CT scans from ED visit and from today reviewed with the patient     Urinalysis: UA RESULTS:  Recent Labs   Lab Test  05/30/17   1557   05/14/17   0755   COLOR  Yellow   < >  Yellow   APPEARANCE  Clear   < >  Slightly Cloudy   URINEGLC  Negative   < >  Negative "   URINEBILI  Negative   < >  Negative   URINEKETONE  Negative   < >  Negative   SG  1.015   < >  1.013   UBLD  Negative   < >  Large*   URINEPH  7.0   < >  6.0   PROTEIN  Negative   < >  Negative   UROBILINOGEN  0.2   < >   --    NITRITE  Negative   < >  Negative   LEUKEST  Negative   < >  Negative   RBCU   --    --   101*   WBCU   --    --   1    < > = values in this interval not displayed.       PSA:     Post Void Residual:     Other labs: None today      IMPRESSION:  38 y/o M with distal ureteral calculus on the left     PLAN:  Will schedule for URS, laser lithotripsy, possible stent placement on 06/15/2017  Patient encouraged to strain his urine.       Total Time: 15 minutes                                       Total in Consultation: greater than 50%

## 2017-05-30 NOTE — LETTER
"5/30/2017       RE: Faye Pugh  [address]  [place] [address]     Dear Colleague,    Thank you for referring your patient, Faye Pugh, to the [facility]OLOGY [facility] [place] at [facility]. Please see a copy of my visit note below.    UROLOGIC DIAGNOSES:       CURRENT INTERVENTIONS:       HISTORY:     Patient presents for evaluation and management of left distal ureteral calculus.   Patient presented to the ED with pain on 05/14/2017. Found on CT scan to have a ~ 6mm ureteral calculus in the distal left ureter.   He notes that his pain is controlled with scheduled ibuprofen and some oxycodone, but he has not had pain over the last five days.     Patient had CT scan today that showed progression of the stone to the distal ureter (nearly UVJ).   We discussed further MET vs URS.        PAST MEDICAL HISTORY: No past medical history on file.    PAST SURGICAL HISTORY: No past surgical history on file.    FAMILY HISTORY: No family history on file.    SOCIAL HISTORY:   Social History   Substance Use Topics     Smoking status: Never Smoker     Smokeless tobacco: Never Used     Alcohol use No       Current Outpatient Prescriptions   Medication     ibuprofen (ADVIL/MOTRIN) 200 MG tablet     oxyCODONE (ROXICODONE) 5 MG IR tablet     tamsulosin (FLOMAX) 0.4 MG capsule     No current facility-administered medications for this visit.      PHYSICAL EXAM:  /70 (BP Location: Left arm, Patient Position: Chair, Cuff Size: Adult Regular)  Pulse 87  Ht 1.6 m (5' 3\")  Wt 57.6 kg (127 lb)  SpO2 98%  BMI 22.5 kg/m2    HEENT: Normocephalic and atraumatic   Cardiac: Not done  Back/Flank: Not done  CNS/PNS: Not done  Respiratory: Normal non-labored breathing  Abdomen: Soft nontender and nondistended  Peripheral Vascular: Not done  Mental Status: Not done    Penis: Not done  Scrotal Skin: Not done  Testicles: Not done  Epididymis: Not done  Digital Rectal Exam: "     Cystoscopy: Not done    Imaging: CT scans from ED visit and from today reviewed with the patient     Urinalysis: UA RESULTS:  Recent Labs   Lab Test  05/30/17   1557   05/14/17   0755   COLOR  Yellow   < >  Yellow   APPEARANCE  Clear   < >  Slightly Cloudy   URINEGLC  Negative   < >  Negative   URINEBILI  Negative   < >  Negative   URINEKETONE  Negative   < >  Negative   SG  1.015   < >  1.013   UBLD  Negative   < >  Large*   URINEPH  7.0   < >  6.0   PROTEIN  Negative   < >  Negative   UROBILINOGEN  0.2   < >   --    NITRITE  Negative   < >  Negative   LEUKEST  Negative   < >  Negative   RBCU   --    --   101*   WBCU   --    --   1    < > = values in this interval not displayed.       PSA:     Post Void Residual:     Other labs: None today    IMPRESSION:  38 y/o M with distal ureteral calculus on the left     PLAN:  Will schedule for URS, laser lithotripsy, possible stent placement on 06/15/2017  Patient encouraged to strain his urine.       Total Time: 15 minutes                                       Total in Consultation: greater than 50%   Amanda Martinez MD

## 2017-05-31 LAB
BACTERIA SPEC CULT: NO GROWTH
Lab: NORMAL
MICRO REPORT STATUS: NORMAL
SPECIMEN SOURCE: NORMAL

## 2017-06-15 ENCOUNTER — SURGERY (OUTPATIENT)
Age: 37
End: 2017-06-15

## 2017-06-15 ENCOUNTER — HOSPITAL ENCOUNTER (OUTPATIENT)
Facility: CLINIC | Age: 37
Discharge: HOME | End: 2017-06-15
Attending: UROLOGY | Admitting: UROLOGY
Payer: COMMERCIAL

## 2017-06-15 ENCOUNTER — ANESTHESIA EVENT (OUTPATIENT)
Dept: SURGERY | Facility: CLINIC | Age: 37
End: 2017-06-15
Payer: COMMERCIAL

## 2017-06-15 ENCOUNTER — ANESTHESIA (OUTPATIENT)
Dept: SURGERY | Facility: CLINIC | Age: 37
End: 2017-06-15
Payer: COMMERCIAL

## 2017-06-15 ENCOUNTER — APPOINTMENT (OUTPATIENT)
Dept: GENERAL RADIOLOGY | Facility: CLINIC | Age: 37
End: 2017-06-15
Attending: UROLOGY
Payer: COMMERCIAL

## 2017-06-15 VITALS
WEIGHT: 123 LBS | OXYGEN SATURATION: 100 % | HEART RATE: 79 BPM | TEMPERATURE: 97.6 F | HEIGHT: 63 IN | BODY MASS INDEX: 21.79 KG/M2 | SYSTOLIC BLOOD PRESSURE: 98 MMHG | DIASTOLIC BLOOD PRESSURE: 61 MMHG | RESPIRATION RATE: 14 BRPM

## 2017-06-15 DIAGNOSIS — N20.0 KIDNEY STONE: ICD-10-CM

## 2017-06-15 DIAGNOSIS — N20.1 CALCULUS OF URETER: Primary | ICD-10-CM

## 2017-06-15 PROCEDURE — 25000128 H RX IP 250 OP 636

## 2017-06-15 PROCEDURE — 71000027 ZZH RECOVERY PHASE 2 EACH 15 MINS: Performed by: UROLOGY

## 2017-06-15 PROCEDURE — C1758 CATHETER, URETERAL: HCPCS | Performed by: UROLOGY

## 2017-06-15 PROCEDURE — C2617 STENT, NON-COR, TEM W/O DEL: HCPCS | Performed by: UROLOGY

## 2017-06-15 PROCEDURE — 25000566 ZZH SEVOFLURANE, EA 15 MIN: Performed by: UROLOGY

## 2017-06-15 PROCEDURE — 25000125 ZZHC RX 250

## 2017-06-15 PROCEDURE — 74420 UROGRAPHY RTRGR +-KUB: CPT | Mod: 26 | Performed by: UROLOGY

## 2017-06-15 PROCEDURE — 40000277 XR SURGERY CARM FLUORO LESS THAN 5 MIN W STILLS

## 2017-06-15 PROCEDURE — 25500064 ZZH RX 255 OP 636: Performed by: UROLOGY

## 2017-06-15 PROCEDURE — 71000012 ZZH RECOVERY PHASE 1 LEVEL 1 FIRST HR: Performed by: UROLOGY

## 2017-06-15 PROCEDURE — 25000128 H RX IP 250 OP 636: Performed by: UROLOGY

## 2017-06-15 PROCEDURE — 52351 CYSTOURETERO & OR PYELOSCOPE: CPT | Performed by: UROLOGY

## 2017-06-15 PROCEDURE — 27210794 ZZH OR GENERAL SUPPLY STERILE: Performed by: UROLOGY

## 2017-06-15 PROCEDURE — C1769 GUIDE WIRE: HCPCS | Performed by: UROLOGY

## 2017-06-15 PROCEDURE — 36000054 ZZH SURGERY LEVEL 2 W FLUORO 1ST 30 MIN: Performed by: UROLOGY

## 2017-06-15 PROCEDURE — 52332 CYSTOSCOPY AND TREATMENT: CPT | Mod: 59 | Performed by: UROLOGY

## 2017-06-15 PROCEDURE — 25800025 ZZH RX 258: Performed by: UROLOGY

## 2017-06-15 PROCEDURE — 40000170 ZZH STATISTIC PRE-PROCEDURE ASSESSMENT II: Performed by: UROLOGY

## 2017-06-15 PROCEDURE — 37000009 ZZH ANESTHESIA TECHNICAL FEE, EACH ADDTL 15 MIN: Performed by: UROLOGY

## 2017-06-15 PROCEDURE — 36000052 ZZH SURGERY LEVEL 2 EA 15 ADDTL MIN: Performed by: UROLOGY

## 2017-06-15 PROCEDURE — 37000008 ZZH ANESTHESIA TECHNICAL FEE, 1ST 30 MIN: Performed by: UROLOGY

## 2017-06-15 DEVICE — STENT URETERAL DBL PIGTAIL INLAY 6FRX24CM 778624
Type: IMPLANTABLE DEVICE | Status: NON-FUNCTIONAL
Removed: 2017-07-06

## 2017-06-15 RX ORDER — TAMSULOSIN HYDROCHLORIDE 0.4 MG/1
0.4 CAPSULE ORAL DAILY
Qty: 14 CAPSULE | Refills: 0 | Status: SHIPPED | OUTPATIENT
Start: 2017-06-15 | End: 2017-06-29

## 2017-06-15 RX ORDER — FENTANYL CITRATE 50 UG/ML
INJECTION, SOLUTION INTRAMUSCULAR; INTRAVENOUS PRN
Status: DISCONTINUED | OUTPATIENT
Start: 2017-06-15 | End: 2017-06-15

## 2017-06-15 RX ORDER — SODIUM CHLORIDE, SODIUM LACTATE, POTASSIUM CHLORIDE, CALCIUM CHLORIDE 600; 310; 30; 20 MG/100ML; MG/100ML; MG/100ML; MG/100ML
INJECTION, SOLUTION INTRAVENOUS CONTINUOUS PRN
Status: DISCONTINUED | OUTPATIENT
Start: 2017-06-15 | End: 2017-06-15

## 2017-06-15 RX ORDER — CEFAZOLIN SODIUM 1 G/3ML
1 INJECTION, POWDER, FOR SOLUTION INTRAMUSCULAR; INTRAVENOUS SEE ADMIN INSTRUCTIONS
Status: CANCELLED | OUTPATIENT
Start: 2017-06-15

## 2017-06-15 RX ORDER — LABETALOL HYDROCHLORIDE 5 MG/ML
10 INJECTION, SOLUTION INTRAVENOUS
Status: DISCONTINUED | OUTPATIENT
Start: 2017-06-15 | End: 2017-06-15 | Stop reason: HOSPADM

## 2017-06-15 RX ORDER — ASPIRIN 81 MG
100 TABLET, DELAYED RELEASE (ENTERIC COATED) ORAL 2 TIMES DAILY
Qty: 28 TABLET | Refills: 1 | Status: SHIPPED | OUTPATIENT
Start: 2017-06-15 | End: 2017-06-29

## 2017-06-15 RX ORDER — NALOXONE HYDROCHLORIDE 0.4 MG/ML
.1-.4 INJECTION, SOLUTION INTRAMUSCULAR; INTRAVENOUS; SUBCUTANEOUS
Status: DISCONTINUED | OUTPATIENT
Start: 2017-06-15 | End: 2017-06-15 | Stop reason: HOSPADM

## 2017-06-15 RX ORDER — ONDANSETRON 2 MG/ML
INJECTION INTRAMUSCULAR; INTRAVENOUS PRN
Status: DISCONTINUED | OUTPATIENT
Start: 2017-06-15 | End: 2017-06-15

## 2017-06-15 RX ORDER — LIDOCAINE HYDROCHLORIDE 20 MG/ML
INJECTION, SOLUTION INFILTRATION; PERINEURAL PRN
Status: DISCONTINUED | OUTPATIENT
Start: 2017-06-15 | End: 2017-06-15

## 2017-06-15 RX ORDER — ONDANSETRON 2 MG/ML
4 INJECTION INTRAMUSCULAR; INTRAVENOUS EVERY 30 MIN PRN
Status: DISCONTINUED | OUTPATIENT
Start: 2017-06-15 | End: 2017-06-15 | Stop reason: HOSPADM

## 2017-06-15 RX ORDER — PHENAZOPYRIDINE HYDROCHLORIDE 200 MG/1
200 TABLET, FILM COATED ORAL 3 TIMES DAILY PRN
Qty: 9 TABLET | Refills: 0 | Status: ON HOLD | OUTPATIENT
Start: 2017-06-15 | End: 2017-07-06

## 2017-06-15 RX ORDER — CEFAZOLIN SODIUM 2 G/100ML
2 INJECTION, SOLUTION INTRAVENOUS
Status: COMPLETED | OUTPATIENT
Start: 2017-06-15 | End: 2017-06-15

## 2017-06-15 RX ORDER — ALBUTEROL SULFATE 0.83 MG/ML
2.5 SOLUTION RESPIRATORY (INHALATION) EVERY 4 HOURS PRN
Status: DISCONTINUED | OUTPATIENT
Start: 2017-06-15 | End: 2017-06-15 | Stop reason: HOSPADM

## 2017-06-15 RX ORDER — TOLTERODINE 2 MG/1
2 CAPSULE, EXTENDED RELEASE ORAL DAILY
Qty: 14 CAPSULE | Refills: 0 | Status: SHIPPED | OUTPATIENT
Start: 2017-06-15 | End: 2017-06-29

## 2017-06-15 RX ORDER — ONDANSETRON 4 MG/1
4 TABLET, ORALLY DISINTEGRATING ORAL EVERY 30 MIN PRN
Status: DISCONTINUED | OUTPATIENT
Start: 2017-06-15 | End: 2017-06-15 | Stop reason: HOSPADM

## 2017-06-15 RX ORDER — HYDRALAZINE HYDROCHLORIDE 20 MG/ML
2.5-5 INJECTION INTRAMUSCULAR; INTRAVENOUS EVERY 10 MIN PRN
Status: DISCONTINUED | OUTPATIENT
Start: 2017-06-15 | End: 2017-06-15 | Stop reason: HOSPADM

## 2017-06-15 RX ORDER — CEFAZOLIN SODIUM 1 G/3ML
1 INJECTION, POWDER, FOR SOLUTION INTRAMUSCULAR; INTRAVENOUS SEE ADMIN INSTRUCTIONS
Status: DISCONTINUED | OUTPATIENT
Start: 2017-06-15 | End: 2017-06-15 | Stop reason: HOSPADM

## 2017-06-15 RX ORDER — GABAPENTIN 100 MG/1
100 CAPSULE ORAL 3 TIMES DAILY
Qty: 42 CAPSULE | Refills: 0 | Status: ON HOLD | OUTPATIENT
Start: 2017-06-15 | End: 2017-07-06

## 2017-06-15 RX ORDER — PROPOFOL 10 MG/ML
INJECTION, EMULSION INTRAVENOUS PRN
Status: DISCONTINUED | OUTPATIENT
Start: 2017-06-15 | End: 2017-06-15

## 2017-06-15 RX ORDER — MEPERIDINE HYDROCHLORIDE 25 MG/ML
12.5 INJECTION INTRAMUSCULAR; INTRAVENOUS; SUBCUTANEOUS
Status: DISCONTINUED | OUTPATIENT
Start: 2017-06-15 | End: 2017-06-15 | Stop reason: HOSPADM

## 2017-06-15 RX ORDER — SODIUM CHLORIDE, SODIUM LACTATE, POTASSIUM CHLORIDE, CALCIUM CHLORIDE 600; 310; 30; 20 MG/100ML; MG/100ML; MG/100ML; MG/100ML
INJECTION, SOLUTION INTRAVENOUS CONTINUOUS
Status: DISCONTINUED | OUTPATIENT
Start: 2017-06-15 | End: 2017-06-15 | Stop reason: HOSPADM

## 2017-06-15 RX ORDER — FENTANYL CITRATE 0.05 MG/ML
25-50 INJECTION, SOLUTION INTRAMUSCULAR; INTRAVENOUS
Status: DISCONTINUED | OUTPATIENT
Start: 2017-06-15 | End: 2017-06-15 | Stop reason: HOSPADM

## 2017-06-15 RX ORDER — CEFAZOLIN SODIUM 2 G/100ML
2 INJECTION, SOLUTION INTRAVENOUS
Status: DISCONTINUED | OUTPATIENT
Start: 2017-06-15 | End: 2017-06-15 | Stop reason: HOSPADM

## 2017-06-15 RX ORDER — OXYCODONE HYDROCHLORIDE 5 MG/1
5-10 TABLET ORAL EVERY 6 HOURS PRN
Qty: 35 TABLET | Refills: 0 | Status: ON HOLD | OUTPATIENT
Start: 2017-06-15 | End: 2017-07-06

## 2017-06-15 RX ADMIN — MIDAZOLAM HYDROCHLORIDE 2 MG: 1 INJECTION, SOLUTION INTRAMUSCULAR; INTRAVENOUS at 14:22

## 2017-06-15 RX ADMIN — SODIUM CHLORIDE 3000 ML: 900 IRRIGANT IRRIGATION at 14:45

## 2017-06-15 RX ADMIN — FENTANYL CITRATE 50 MCG: 50 INJECTION, SOLUTION INTRAMUSCULAR; INTRAVENOUS at 14:23

## 2017-06-15 RX ADMIN — LIDOCAINE HYDROCHLORIDE 60 MG: 20 INJECTION, SOLUTION INFILTRATION; PERINEURAL at 14:23

## 2017-06-15 RX ADMIN — ONDANSETRON 4 MG: 2 INJECTION INTRAMUSCULAR; INTRAVENOUS at 14:52

## 2017-06-15 RX ADMIN — PROPOFOL 150 MG: 10 INJECTION, EMULSION INTRAVENOUS at 14:23

## 2017-06-15 RX ADMIN — IOTHALAMATE MEGLUMINE 8 ML: 600 INJECTION INTRAVASCULAR at 14:56

## 2017-06-15 RX ADMIN — SODIUM CHLORIDE, POTASSIUM CHLORIDE, SODIUM LACTATE AND CALCIUM CHLORIDE: 600; 310; 30; 20 INJECTION, SOLUTION INTRAVENOUS at 14:21

## 2017-06-15 RX ADMIN — FENTANYL CITRATE 50 MCG: 50 INJECTION, SOLUTION INTRAMUSCULAR; INTRAVENOUS at 14:47

## 2017-06-15 RX ADMIN — CEFAZOLIN SODIUM 2 G: 2 INJECTION, SOLUTION INTRAVENOUS at 14:25

## 2017-06-15 NOTE — OP NOTE
PREOPERATIVE DIAGNOSIS: left ureteral stone  POSTOPERATIVE DIAGNOSIS: Same    PROCEDURES:   1. Cystoscopy.  2. Left semi-rigid ureteroscopy  3. Placement of 6fr x 24cm double J Left ureteral stent   4. Left Retrograde pyelogram  5. Interpretation of Fluoroscopic Imaing    SURGEON: Amanda Martinez MD - who was present and scrubbed for the procedure.    RESIDENT:  Yogesh Thompson   ANESTHESIA:  General  EBL:  1cc  FLUIDS:  See dictated anesthesia record for details  SPECIMEN: stones for analysis  FINDINGS:   1. Left intramural ureter too narrow to accommodate the semirigid ureteroscope, did not easily dilate with 10f dual lumen catheter.  Elected to place stent to allow for passive ureteral dilation    INDICATIONS: Faye Pugh is a 37 year old male  with a left distal ureteral stone.  After a discussion of the risks, benefits, and alternatives he opted for treatment of the stone with ureteroscopy and stent placement. he was consented electively to have this done in clinic previously and understand the risks of bleeding, damage to structures, incomplete treatment of the stone burden in a single procedure and need for other procedures subsequently.    DESCRIPTION OF PROCEDURE:   After obtaining informed consent, the patient was brought to the operating room and placed in the supine position on the operating room table. All pressure points were adequately cushioned and pneumatic compression devices were applied to the patient's bilateral lower extremities. Appropriate preoperative antibiotics were administered. General endotracheal anesthesia was induced without difficulty. The patient was repositioned into the dorsal lithotomy position. The patient was then prepped and draped in the usual sterile fashion. A timeout was performed to confirm the correct patient, positioning, procedure, and laterality.   Procedure was initiated with insertion of a 22 F rigid cystoscope into the bladder.  The urethra was normal without  stricture.  The prostate was 3cm in length and non obstructing.  At this time a full cystoscopy was performed and the bladder was of normal capacity, without tumors, stones, or diverticuli, and the bilateral ureteral orifices were in the normal orthotopic position.    At this time the left  UO was cannulated with a sensor guidewire, and advanced to the renal pelvis under fluoscopic guidance. A semirigid ureteroscope was introduced to the left  ureteral orifice and into the intramural ureter with assistance of a second wire through the scope.  We were unable to pass the scope beyond the intramural ureter due to narrowing of the distal ureter.  We removed the scope and attempted to pass a 10f dual lumen catheter to dilate the ureter, however this also did not pass easily over the wire.  We re-inserted the semirigid scope and again were unable to pass the scope beyond the intramural ureter after dilation.  At this point, we elected to proceed with ureteral stent placement to allow for passive ureteral dilation.  A [id number] stent was passed, proximal curl positioned in an upper pole calyx, distal curl seen nicely within the bladder  The bladder was then drained, and that terminated the procedure.    The patient was awakened from anesthesia, transferred to the PACU in stable condition.    SUMMARY/PLAN:  Return in 1-2 weeks for ureteroscopy & hll    Yogesh Thompson

## 2017-06-15 NOTE — IP AVS SNAPSHOT
[facility] PreOP/Phase II    [address]    [place] [address]    Phone:  [phone]                                       After Visit Summary   6/15/2017    Faye Pugh    MRN: [id number]           After Visit Summary Signature Page     I have received my discharge instructions, and my questions have been answered. I have discussed any challenges I see with this plan with the nurse or doctor.    ..........................................................................................................................................  Patient/Patient Representative Signature      ..........................................................................................................................................  Patient Representative Print Name and Relationship to Patient    ..................................................               ................................................  Date                                            Time    ..........................................................................................................................................  Reviewed by Signature/Title    ...................................................              ..............................................  Date                                                            Time

## 2017-06-15 NOTE — IP AVS SNAPSHOT
MRN:[id number]                      After Visit Summary   6/15/2017    Faye Pugh    MRN: [id number]           Thank you!     Thank you for choosing [facility] for your care. Our goal is always to provide you with excellent care. Hearing back from our patients is one way we can continue to improve our services. Please take a few minutes to complete the written survey that you may receive in the mail after you visit with us. Thank you!        Patient Information     Date Of Birth          1980        About your hospital stay     You were admitted on:  Karlie 15, 2017 You last received care in the:  [facility] PreOP/Phase II    You were discharged on:  Karlie 15, 2017       Who to Call     For medical emergencies, please call 911.  For non-urgent questions about your medical care, please call your primary care provider or clinic, [phone]  For questions related to your surgery, please call your surgery clinic        Attending Provider     Provider Amanda Dowell MD Urology       Primary Care Provider Office Phone # Fax #    Vincent BrennerMARITO [phone]      After Care Instructions     Discharge Instructions       DIET:  -Regular    DISCHARGE ACTIVITY  - return to normal activity only limited by hematuria (blood in the urine) that gets worse with increased activity due to the stent - this is normal  - Do not drive until you can press the brake pedal quickly and fully without pain.   - Do not operate a motor vehicle while taking narcotic pain medications.     MEDICATIONS   - Do not take any additional Tylenol (acetaminophen) while using Percocet or Vicodin.  - Do not take more than 4,000mg of Tylenol (acetaminophen) in any 24 hour period, as this can cause liver damage.  - Take stool softeners such as Senna while you are using narcotics, but stop if you develop diarrhea.   - Wean yourself off of narcotic pain medications.   - Flomax, detrol, pyridium  and gabapentin may help you with your stent symptoms.    STENT INSTRUCTIONS:   - You have a ureteral stent in place. You can expect some flank discomfort on that side, possibly worse with urination, and you may experience the urge to void more frequently. You may experience burning in your urethra with urination as well. Continue to take medications as prescribed to reduce stent discomfort. Drink 2-3L of water a day to keep your urine clear to light pink in color. Some blood in your urine can be expected but should clear with increased water consumption as instructed. Call for thickening red urine, or inability to void.   - The stent is temporary, it is very important that you have the stent removed after your next stone procedure    FOLLOW-UP:   - Follow up with Dr. Martinez for your next stone procedure in 1-2 weeks.  - Call or return sooner than your regularly scheduled visit if you develop any of the following:  fever, uncontrolled pain, uncontrolled nausea or vomiting, as well as increased redness, swelling, or drainage from your wound.                  Your next 10 appointments already scheduled     Jun 27, 2017  2:30 PM CDT   Return Visit with Amanda Martinez MD, [facility] ([facility])    [address]  [address]  [facility] [address] [phone]              Further instructions from your care team       Same Day Surgery Discharge Instructions for  Sedation and General Anesthesia       It's not unusual to feel dizzy, light-headed or faint for up to 24 hours after surgery or while taking pain medication.  If you have these symptoms: sit for a few minutes before standing and have someone assist you when you get up to walk or use the bathroom.      You should rest and relax for the next 24 hours. We recommend you make arrangements to have an adult stay with you for at least 24 hours after your discharge.  Avoid hazardous and  strenuous activity.      DO NOT DRIVE any vehicle or operate mechanical equipment for 24 hours following the end of your surgery.  Even though you may feel normal, your reactions may be affected by the medication you have received.      Do not drink alcoholic beverages for 24 hours following surgery.       Slowly progress to your regular diet as you feel able. It's not unusual to feel nauseated and/or vomit after receiving anesthesia.  If you develop these symptoms, drink clear liquids (apple juice, ginger ale, broth, 7-up, etc. ) until you feel better.  If your nausea and vomiting persists for 24 hours, please notify your surgeon.        All narcotic pain medications, along with inactivity and anesthesia, can cause constipation. Drinking plenty of liquids and increasing fiber intake will help.      For any questions of a medical nature, call your surgeon.      Do not make important decisions for 24 hours.      If you had general anesthesia, you may have a sore throat for a couple of days related to the breathing tube used during surgery.  You may use Cepacol lozenges to help with this discomfort.  If it worsens or if you develop a fever, contact your surgeon.     If you feel your pain is not well managed with the pain medications prescribed by your surgeon, please contact your surgeon's office to let them know so they can address your concerns.       Cystoscopy and Stent Placement Discharge Instructions    During surgery, a stent was placed in the ureter.  The ureter is the tube that drains urine from the kidney to the bladder.  The stent is placed to dilate (open) the ureter so the stone fragments can pass easily through the ureter or to decrease ureteral swelling after surgery, or to relieve an obstruction. The stent is made of rubber. The upper end of the stent curls in the kidney while the lower end rests in the bladder      Diet:    Return to the diet that you were on before the procedure, unless you are given  "specific diet instructions.    It is important to drink 6-8 glasses of fluids per day at home - at least 3-4 glasses should be water.    Activity:    Walk short distances and increase as your strength allows.    You may climb stairs.    Do not do strenuous exercise or heavy lifting until approved by surgeon.    Do not drive while taking narcotic pain medications.    Bathing:    You may take a shower.    While the stent is in place you may experience the following symptoms:    Blood and/or small blood clots in urine.    Bladder spasm (frequency and urgency of urination).    Discomfort or aching in the back or side where the stent is.    Burning or discomfort at the end of urine stream.    To decrease these symptoms you should:    Take pain medication as prescribed.    Drink plenty of fluids.    If you experience pain at the end of urination try not emptying your bladder completely.    If having discomfort in back or side, decrease activity.    Call your physician if these signs/symptoms are present:    Pain that is not relieved by a short rest or ordered pain medications.    Temperature at or above 101.0 F or chills.    Inability or difficulty urinating.     Excessive blood in urine.    Any questions or concerns.        **If you have questions or concerns about your procedure,   call Dr. Martinez [phone]**          Pending Results     Date and Time Order Name Status Description    6/15/2017 1502 XR Surgery NEAL Fluoro L/T 5 Min w Stills In process             Admission Information     Date & Time Provider Department Dept. Phone    6/15/2017 Amanda Martinez MD [facility] PreOP/Phase [phone]      Your Vitals Were     Blood Pressure Pulse Temperature Respirations Height Weight    133/94 79 97.6  F (36.4  C) (Temporal) 16 1.6 m (5' 3\") 55.8 kg (123 lb)    Pulse Oximetry BMI (Body Mass Index)                98% 21.79 kg/m2          [employer] Information     [employer] gives you secure access to " your electronic health record. If you see a primary care provider, you can also send messages to your care team and make appointments. If you have questions, please call your primary care clinic.  If you do not have a primary care provider, please call [phone] and they will assist you.        Care EveryWhere ID     This is your Care EveryWhere ID. This could be used by other organizations to access your [facility] medical records  [id number]           Review of your medicines      START taking        Dose / Directions    docusate sodium 100 MG tablet   Commonly known as:  COLACE   Used for:  Kidney stone        Dose:  100 mg   Take 100 mg by mouth 2 times daily for 14 days   Quantity:  28 tablet   Refills:  1       gabapentin 100 MG capsule   Commonly known as:  NEURONTIN   Used for:  Kidney stone        Dose:  100 mg   Take 1 capsule (100 mg) by mouth 3 times daily for 14 days   Quantity:  42 capsule   Refills:  0       phenazopyridine 200 MG tablet   Commonly known as:  PYRIDIUM   Used for:  Kidney stone        Dose:  200 mg   Take 1 tablet (200 mg) by mouth 3 times daily as needed for irritation Expect your urine to appear bright orange   Quantity:  9 tablet   Refills:  0       tolterodine 2 MG 24 hr capsule   Commonly known as:  DETROL LA   Used for:  Kidney stone        Dose:  2 mg   Take 1 capsule (2 mg) by mouth daily for 14 days   Quantity:  14 capsule   Refills:  0         CONTINUE these medicines which may have CHANGED, or have new prescriptions. If we are uncertain of the size of tablets/capsules you have at home, strength may be listed as something that might have changed.        Dose / Directions    oxyCODONE 5 MG IR tablet   Commonly known as:  ROXICODONE   This may have changed:  how much to take   Used for:  Kidney stone        Dose:  5-10 mg   Take 1-2 tablets (5-10 mg) by mouth every 6 hours as needed for pain   Quantity:  35 tablet   Refills:  0         CONTINUE these medicines which have NOT  CHANGED        Dose / Directions    ibuprofen 200 MG tablet   Commonly known as:  ADVIL/MOTRIN        Refills:  0       tamsulosin 0.4 MG capsule   Commonly known as:  FLOMAX   Used for:  Kidney stone        Dose:  0.4 mg   Take 1 capsule (0.4 mg) by mouth daily for 14 days   Quantity:  14 capsule   Refills:  0            Where to get your medicines      These medications were sent to [facility] [place], [place] - [address]  [address], [place] [address]     Phone:  [phone]     docusate sodium 100 MG tablet    gabapentin 100 MG capsule    phenazopyridine 200 MG tablet    tamsulosin 0.4 MG capsule    tolterodine 2 MG 24 hr capsule         Some of these will need a paper prescription and others can be bought over the counter. Ask your nurse if you have questions.     Bring a paper prescription for each of these medications     oxyCODONE 5 MG IR tablet                Protect others around you: Learn how to safely use, store and throw away your medicines at www.disposemymeds.org.             Medication List: This is a list of all your medications and when to take them. Check marks below indicate your daily home schedule. Keep this list as a reference.      Medications           Morning Afternoon Evening Bedtime As Needed    docusate sodium 100 MG tablet   Commonly known as:  COLACE   Take 100 mg by mouth 2 times daily for 14 days                                gabapentin 100 MG capsule   Commonly known as:  NEURONTIN   Take 1 capsule (100 mg) by mouth 3 times daily for 14 days                                ibuprofen 200 MG tablet   Commonly known as:  ADVIL/MOTRIN                                oxyCODONE 5 MG IR tablet   Commonly known as:  ROXICODONE   Take 1-2 tablets (5-10 mg) by mouth every 6 hours as needed for pain                                phenazopyridine 200 MG tablet   Commonly known as:  PYRIDIUM   Take 1 tablet (200 mg) by mouth 3 times daily as needed for  irritation Expect your urine to appear bright orange                                tamsulosin 0.4 MG capsule   Commonly known as:  FLOMAX   Take 1 capsule (0.4 mg) by mouth daily for 14 days                                tolterodine 2 MG 24 hr capsule   Commonly known as:  DETROL LA   Take 1 capsule (2 mg) by mouth daily for 14 days

## 2017-06-15 NOTE — ANESTHESIA PREPROCEDURE EVALUATION
Anesthesia Evaluation     . Pt has had prior anesthetic.     No history of anesthetic complications          ROS/MED HX    ENT/Pulmonary:      (-) sleep apnea   Neurologic:       Cardiovascular:         METS/Exercise Tolerance:     Hematologic:         Musculoskeletal:         GI/Hepatic:        (-) GERD   Renal/Genitourinary:     (+) Nephrolithiasis ,       Endo:         Psychiatric:         Infectious Disease:         Malignancy:         Other:                     Physical Exam  Normal systems: cardiovascular, pulmonary and dental    Airway   Mallampati: III  TM distance: >3 FB  Neck ROM: full    Dental     Cardiovascular   Rhythm and rate: regular and normal      Pulmonary    breath sounds clear to auscultation                    Anesthesia Plan      History & Physical Review  History and physical reviewed and following examination; no interval change.    ASA Status:  1 .    NPO Status:  > 8 hours    Plan for General and LMA with Intravenous induction. Maintenance will be TIVA.    PONV prophylaxis:  Ondansetron (or other 5HT-3) and Dexamethasone or Solumedrol (Propofol gtt)       Postoperative Care      Consents  Anesthetic plan, risks, benefits and alternatives discussed with:  Patient..                      Procedure: Procedure(s):  COMBINED CYSTOSCOPY, URETEROSCOPY, LASER HOLMIUM LITHOTRIPSY URETER(S), INSERT STENT  Preop diagnosis: LEFT URETERAL CALCULUS    No Known Allergies  History reviewed. No pertinent past medical history.  History reviewed. No pertinent surgical history.  Prior to Admission medications    Medication Sig Start Date End Date Taking? Authorizing Provider   oxyCODONE (ROXICODONE) 5 MG IR tablet Take 1 tablet (5 mg) by mouth every 6 hours as needed for pain 5/16/17  Yes Amanda Martinez MD   tamsulosin (FLOMAX) 0.4 MG capsule Take 1 capsule (0.4 mg) by mouth daily 5/16/17  Yes Amanda Martinez MD   ibuprofen (ADVIL/MOTRIN) 200 MG tablet  5/14/17   Reported, Patient      Current Facility-Administered Medications Ordered in Epic   Medication Dose Route Frequency Last Rate Last Dose     ceFAZolin (ANCEF) 1 g vial to attach to  ml bag for ADULT or 50 ml bag for PEDS  1 g Intravenous See Admin Instructions         ceFAZolin sodium-dextrose (ANCEF) infusion 2 g  2 g Intravenous Pre-Op/Pre-procedure x 1 dose         No current [facility]-ordered outpatient prescriptions on file.     Wt Readings from Last 1 Encounters:   06/15/17 55.8 kg (123 lb)     Temp Readings from Last 1 Encounters:   06/15/17 36.9  C (98.4  F) (Oral)     BP Readings from Last 6 Encounters:   06/15/17 128/85   05/30/17 110/70   05/16/17 140/82   05/14/17 128/87     Pulse Readings from Last 4 Encounters:   06/15/17 79   05/30/17 87   05/16/17 80     Resp Readings from Last 1 Encounters:   06/15/17 16     SpO2 Readings from Last 1 Encounters:   06/15/17 99%     Recent Labs   Lab Test  05/14/17   0704   NA  140   POTASSIUM  4.0   CHLORIDE  107   CO2  26   ANIONGAP  7   GLC  155*   BUN  17   CR  0.88   JONNY  9.0     Recent Labs   Lab Test  05/14/17   0704   WBC  10.2   HGB  16.3   PLT  235     No results for input(s): INR in the last 22701 hours.    Invalid input(s): APTT   RECENT LABS:   ECG:   ECHO:   CXR:

## 2017-06-15 NOTE — DISCHARGE INSTRUCTIONS
Same Day Surgery Discharge Instructions for  Sedation and General Anesthesia       It's not unusual to feel dizzy, light-headed or faint for up to 24 hours after surgery or while taking pain medication.  If you have these symptoms: sit for a few minutes before standing and have someone assist you when you get up to walk or use the bathroom.      You should rest and relax for the next 24 hours. We recommend you make arrangements to have an adult stay with you for at least 24 hours after your discharge.  Avoid hazardous and strenuous activity.      DO NOT DRIVE any vehicle or operate mechanical equipment for 24 hours following the end of your surgery.  Even though you may feel normal, your reactions may be affected by the medication you have received.      Do not drink alcoholic beverages for 24 hours following surgery.       Slowly progress to your regular diet as you feel able. It's not unusual to feel nauseated and/or vomit after receiving anesthesia.  If you develop these symptoms, drink clear liquids (apple juice, ginger ale, broth, 7-up, etc. ) until you feel better.  If your nausea and vomiting persists for 24 hours, please notify your surgeon.        All narcotic pain medications, along with inactivity and anesthesia, can cause constipation. Drinking plenty of liquids and increasing fiber intake will help.      For any questions of a medical nature, call your surgeon.      Do not make important decisions for 24 hours.      If you had general anesthesia, you may have a sore throat for a couple of days related to the breathing tube used during surgery.  You may use Cepacol lozenges to help with this discomfort.  If it worsens or if you develop a fever, contact your surgeon.     If you feel your pain is not well managed with the pain medications prescribed by your surgeon, please contact your surgeon's office to let them know so they can address your concerns.       Cystoscopy and Stent Placement Discharge  Instructions    During surgery, a stent was placed in the ureter.  The ureter is the tube that drains urine from the kidney to the bladder.  The stent is placed to dilate (open) the ureter so the stone fragments can pass easily through the ureter or to decrease ureteral swelling after surgery, or to relieve an obstruction. The stent is made of rubber. The upper end of the stent curls in the kidney while the lower end rests in the bladder      Diet:    Return to the diet that you were on before the procedure, unless you are given specific diet instructions.    It is important to drink 6-8 glasses of fluids per day at home - at least 3-4 glasses should be water.    Activity:    Walk short distances and increase as your strength allows.    You may climb stairs.    Do not do strenuous exercise or heavy lifting until approved by surgeon.    Do not drive while taking narcotic pain medications.    Bathing:    You may take a shower.    While the stent is in place you may experience the following symptoms:    Blood and/or small blood clots in urine.    Bladder spasm (frequency and urgency of urination).    Discomfort or aching in the back or side where the stent is.    Burning or discomfort at the end of urine stream.    To decrease these symptoms you should:    Take pain medication as prescribed.    Drink plenty of fluids.    If you experience pain at the end of urination try not emptying your bladder completely.    If having discomfort in back or side, decrease activity.    Call your physician if these signs/symptoms are present:    Pain that is not relieved by a short rest or ordered pain medications.    Temperature at or above 101.0 F or chills.    Inability or difficulty urinating.     Excessive blood in urine.    Any questions or concerns.        **If you have questions or concerns about your procedure,   call Dr. Martinez [phone]**

## 2017-06-15 NOTE — ANESTHESIA POSTPROCEDURE EVALUATION
Patient: Faye Pugh    Procedure(s):  CYSTOSCOPY, LEFT URETEROSCOPY, LEFT STENT PLACEMENT  - Wound Class: II-Clean Contaminated    Diagnosis:LEFT URETERAL CALCULUS  Diagnosis Additional Information: No value filed.    Anesthesia Type:  General, LMA    Note:  Anesthesia Post Evaluation    Patient location during evaluation: PACU  Patient participation: Able to fully participate in evaluation  Level of consciousness: awake and alert  Pain management: adequate  Airway patency: patent  Cardiovascular status: acceptable  Respiratory status: acceptable  Hydration status: acceptable  PONV: none     Anesthetic complications: None          Last vitals:  Vitals:    06/15/17 1530 06/15/17 1545 06/15/17 1600   BP: 127/89 129/90 (!) 133/94   Pulse:      Resp: 14 11 16   Temp: 36.3  C (97.3  F) 36.3  C (97.4  F) 36.4  C (97.6  F)   SpO2: 97% 97% 98%         Electronically Signed By: Yogesh Minaya MD  Karlie 15, 2017  4:08 PM

## 2017-06-15 NOTE — ANESTHESIA CARE TRANSFER NOTE
Patient: Faye Pugh    Procedure(s):  CYSTOSCOPY, LEFT URETEROSCOPY, LEFT STENT PLACEMENT  - Wound Class: II-Clean Contaminated    Diagnosis: LEFT URETERAL CALCULUS  Diagnosis Additional Information: No value filed.    Anesthesia Type:   General, LMA     Note:  Airway :Face Mask  Patient transferred to:PACU  Comments: Spontaneous respirations, airway patent, LMA removed atraumatically. Oxygen via SFM at 10 LPM to PACU, connected to wall O2 in PACU. All monitors and alarms on and functioning. Report given to PACU RN and questions answered.       Vitals: (Last set prior to Anesthesia Care Transfer)    CRNA VITALS  6/15/2017 1437 - 6/15/2017 1510      6/15/2017             Pulse: 65    SpO2: 100 %    Resp Rate (observed): 12    Resp Rate (set): 10                Electronically Signed By: TRISTA Huertas CRNA  Karlie 15, 2017  3:10 PM

## 2017-06-30 DIAGNOSIS — N20.0 KIDNEY STONE: Primary | ICD-10-CM

## 2017-06-30 DIAGNOSIS — N20.0 KIDNEY STONE: ICD-10-CM

## 2017-06-30 LAB
ALBUMIN UR-MCNC: >=300 MG/DL
APPEARANCE UR: ABNORMAL
BILIRUB UR QL STRIP: NEGATIVE
COLOR UR AUTO: ABNORMAL
GLUCOSE UR STRIP-MCNC: NEGATIVE MG/DL
HGB UR QL STRIP: ABNORMAL
KETONES UR STRIP-MCNC: NEGATIVE MG/DL
LEUKOCYTE ESTERASE UR QL STRIP: ABNORMAL
NITRATE UR QL: NEGATIVE
PH UR STRIP: 5.5 PH (ref 5–7)
SP GR UR STRIP: >1.03 (ref 1–1.03)
URN SPEC COLLECT METH UR: ABNORMAL
UROBILINOGEN UR STRIP-ACNC: 0.2 EU/DL (ref 0.2–1)

## 2017-06-30 PROCEDURE — 81003 URINALYSIS AUTO W/O SCOPE: CPT | Performed by: UROLOGY

## 2017-06-30 PROCEDURE — 87086 URINE CULTURE/COLONY COUNT: CPT | Performed by: UROLOGY

## 2017-07-01 LAB
BACTERIA SPEC CULT: NO GROWTH
Lab: NORMAL
MICRO REPORT STATUS: NORMAL
SPECIMEN SOURCE: NORMAL

## 2017-07-06 ENCOUNTER — HOSPITAL ENCOUNTER (OUTPATIENT)
Facility: CLINIC | Age: 37
Discharge: HOME | End: 2017-07-06
Attending: UROLOGY | Admitting: UROLOGY
Payer: COMMERCIAL

## 2017-07-06 ENCOUNTER — ANESTHESIA (OUTPATIENT)
Dept: SURGERY | Facility: CLINIC | Age: 37
End: 2017-07-06
Payer: COMMERCIAL

## 2017-07-06 ENCOUNTER — SURGERY (OUTPATIENT)
Age: 37
End: 2017-07-06

## 2017-07-06 ENCOUNTER — ANESTHESIA EVENT (OUTPATIENT)
Dept: SURGERY | Facility: CLINIC | Age: 37
End: 2017-07-06
Payer: COMMERCIAL

## 2017-07-06 ENCOUNTER — APPOINTMENT (OUTPATIENT)
Dept: GENERAL RADIOLOGY | Facility: CLINIC | Age: 37
End: 2017-07-06
Attending: UROLOGY
Payer: COMMERCIAL

## 2017-07-06 VITALS
TEMPERATURE: 96.2 F | HEART RATE: 77 BPM | BODY MASS INDEX: 21.65 KG/M2 | SYSTOLIC BLOOD PRESSURE: 122 MMHG | RESPIRATION RATE: 16 BRPM | HEIGHT: 63 IN | WEIGHT: 122.2 LBS | OXYGEN SATURATION: 100 % | DIASTOLIC BLOOD PRESSURE: 84 MMHG

## 2017-07-06 DIAGNOSIS — N20.0 KIDNEY STONE: ICD-10-CM

## 2017-07-06 PROCEDURE — 37000008 ZZH ANESTHESIA TECHNICAL FEE, 1ST 30 MIN: Performed by: UROLOGY

## 2017-07-06 PROCEDURE — 27210794 ZZH OR GENERAL SUPPLY STERILE: Performed by: UROLOGY

## 2017-07-06 PROCEDURE — 36000056 ZZH SURGERY LEVEL 3 1ST 30 MIN: Performed by: UROLOGY

## 2017-07-06 PROCEDURE — 71000012 ZZH RECOVERY PHASE 1 LEVEL 1 FIRST HR: Performed by: UROLOGY

## 2017-07-06 PROCEDURE — 27210995 ZZH RX 272: Performed by: UROLOGY

## 2017-07-06 PROCEDURE — 25800025 ZZH RX 258: Performed by: UROLOGY

## 2017-07-06 PROCEDURE — 40000170 ZZH STATISTIC PRE-PROCEDURE ASSESSMENT II: Performed by: UROLOGY

## 2017-07-06 PROCEDURE — C1769 GUIDE WIRE: HCPCS | Performed by: UROLOGY

## 2017-07-06 PROCEDURE — 71000027 ZZH RECOVERY PHASE 2 EACH 15 MINS: Performed by: UROLOGY

## 2017-07-06 PROCEDURE — 36000058 ZZH SURGERY LEVEL 3 EA 15 ADDTL MIN: Performed by: UROLOGY

## 2017-07-06 PROCEDURE — 27211024 ZZHC OR SUPPLY OTHER OPNP: Performed by: UROLOGY

## 2017-07-06 PROCEDURE — 25000128 H RX IP 250 OP 636: Performed by: NURSE ANESTHETIST, CERTIFIED REGISTERED

## 2017-07-06 PROCEDURE — 37000009 ZZH ANESTHESIA TECHNICAL FEE, EACH ADDTL 15 MIN: Performed by: UROLOGY

## 2017-07-06 PROCEDURE — C1758 CATHETER, URETERAL: HCPCS | Performed by: UROLOGY

## 2017-07-06 PROCEDURE — 82365 CALCULUS SPECTROSCOPY: CPT | Performed by: UROLOGY

## 2017-07-06 PROCEDURE — 40000277 XR SURGERY CARM FLUORO LESS THAN 5 MIN W STILLS

## 2017-07-06 PROCEDURE — C2617 STENT, NON-COR, TEM W/O DEL: HCPCS | Performed by: UROLOGY

## 2017-07-06 PROCEDURE — 25000125 ZZHC RX 250: Performed by: NURSE ANESTHETIST, CERTIFIED REGISTERED

## 2017-07-06 PROCEDURE — 25000566 ZZH SEVOFLURANE, EA 15 MIN: Performed by: UROLOGY

## 2017-07-06 PROCEDURE — 88300 SURGICAL PATH GROSS: CPT | Performed by: UROLOGY

## 2017-07-06 PROCEDURE — 88300 SURGICAL PATH GROSS: CPT | Mod: 26 | Performed by: UROLOGY

## 2017-07-06 PROCEDURE — 52356 CYSTO/URETERO W/LITHOTRIPSY: CPT | Performed by: UROLOGY

## 2017-07-06 PROCEDURE — 25000128 H RX IP 250 OP 636: Performed by: UROLOGY

## 2017-07-06 DEVICE — STENT URETERAL DBL PIGTAIL INLAY 6FRX24CM 778624: Type: IMPLANTABLE DEVICE | Site: URETER | Status: FUNCTIONAL

## 2017-07-06 RX ORDER — TAMSULOSIN HYDROCHLORIDE 0.4 MG/1
0.4 CAPSULE ORAL DAILY
Qty: 14 CAPSULE | Refills: 0 | Status: SHIPPED | OUTPATIENT
Start: 2017-07-06 | End: 2017-07-18

## 2017-07-06 RX ORDER — SODIUM CHLORIDE, SODIUM LACTATE, POTASSIUM CHLORIDE, CALCIUM CHLORIDE 600; 310; 30; 20 MG/100ML; MG/100ML; MG/100ML; MG/100ML
INJECTION, SOLUTION INTRAVENOUS CONTINUOUS
Status: DISCONTINUED | OUTPATIENT
Start: 2017-07-06 | End: 2017-07-06 | Stop reason: HOSPADM

## 2017-07-06 RX ORDER — ONDANSETRON 2 MG/ML
INJECTION INTRAMUSCULAR; INTRAVENOUS PRN
Status: DISCONTINUED | OUTPATIENT
Start: 2017-07-06 | End: 2017-07-06

## 2017-07-06 RX ORDER — MAGNESIUM HYDROXIDE 1200 MG/15ML
LIQUID ORAL PRN
Status: DISCONTINUED | OUTPATIENT
Start: 2017-07-06 | End: 2017-07-06 | Stop reason: HOSPADM

## 2017-07-06 RX ORDER — MEPERIDINE HYDROCHLORIDE 25 MG/ML
12.5 INJECTION INTRAMUSCULAR; INTRAVENOUS; SUBCUTANEOUS
Status: DISCONTINUED | OUTPATIENT
Start: 2017-07-06 | End: 2017-07-06 | Stop reason: HOSPADM

## 2017-07-06 RX ORDER — PROPOFOL 10 MG/ML
INJECTION, EMULSION INTRAVENOUS PRN
Status: DISCONTINUED | OUTPATIENT
Start: 2017-07-06 | End: 2017-07-06

## 2017-07-06 RX ORDER — SODIUM CHLORIDE, SODIUM LACTATE, POTASSIUM CHLORIDE, CALCIUM CHLORIDE 600; 310; 30; 20 MG/100ML; MG/100ML; MG/100ML; MG/100ML
INJECTION, SOLUTION INTRAVENOUS CONTINUOUS PRN
Status: DISCONTINUED | OUTPATIENT
Start: 2017-07-06 | End: 2017-07-06

## 2017-07-06 RX ORDER — HYDRALAZINE HYDROCHLORIDE 20 MG/ML
2.5-5 INJECTION INTRAMUSCULAR; INTRAVENOUS EVERY 10 MIN PRN
Status: DISCONTINUED | OUTPATIENT
Start: 2017-07-06 | End: 2017-07-06 | Stop reason: HOSPADM

## 2017-07-06 RX ORDER — PHENAZOPYRIDINE HYDROCHLORIDE 200 MG/1
200 TABLET, FILM COATED ORAL 3 TIMES DAILY PRN
Qty: 9 TABLET | Refills: 0 | Status: SHIPPED | OUTPATIENT
Start: 2017-07-06 | End: 2017-07-18

## 2017-07-06 RX ORDER — LABETALOL HYDROCHLORIDE 5 MG/ML
10 INJECTION, SOLUTION INTRAVENOUS
Status: DISCONTINUED | OUTPATIENT
Start: 2017-07-06 | End: 2017-07-06 | Stop reason: HOSPADM

## 2017-07-06 RX ORDER — FENTANYL CITRATE 0.05 MG/ML
25-50 INJECTION, SOLUTION INTRAMUSCULAR; INTRAVENOUS
Status: DISCONTINUED | OUTPATIENT
Start: 2017-07-06 | End: 2017-07-06 | Stop reason: HOSPADM

## 2017-07-06 RX ORDER — ALBUTEROL SULFATE 0.83 MG/ML
2.5 SOLUTION RESPIRATORY (INHALATION) EVERY 4 HOURS PRN
Status: DISCONTINUED | OUTPATIENT
Start: 2017-07-06 | End: 2017-07-06 | Stop reason: HOSPADM

## 2017-07-06 RX ORDER — ONDANSETRON 4 MG/1
4 TABLET, ORALLY DISINTEGRATING ORAL EVERY 30 MIN PRN
Status: DISCONTINUED | OUTPATIENT
Start: 2017-07-06 | End: 2017-07-06 | Stop reason: HOSPADM

## 2017-07-06 RX ORDER — ASPIRIN 81 MG
100 TABLET, DELAYED RELEASE (ENTERIC COATED) ORAL DAILY
Qty: 60 TABLET | Refills: 0 | Status: SHIPPED | OUTPATIENT
Start: 2017-07-06 | End: 2017-07-18

## 2017-07-06 RX ORDER — DEXAMETHASONE SODIUM PHOSPHATE 4 MG/ML
INJECTION, SOLUTION INTRA-ARTICULAR; INTRALESIONAL; INTRAMUSCULAR; INTRAVENOUS; SOFT TISSUE PRN
Status: DISCONTINUED | OUTPATIENT
Start: 2017-07-06 | End: 2017-07-06

## 2017-07-06 RX ORDER — NALOXONE HYDROCHLORIDE 0.4 MG/ML
.1-.4 INJECTION, SOLUTION INTRAMUSCULAR; INTRAVENOUS; SUBCUTANEOUS
Status: DISCONTINUED | OUTPATIENT
Start: 2017-07-06 | End: 2017-07-06 | Stop reason: HOSPADM

## 2017-07-06 RX ORDER — CEFAZOLIN SODIUM 1 G/3ML
1 INJECTION, POWDER, FOR SOLUTION INTRAMUSCULAR; INTRAVENOUS SEE ADMIN INSTRUCTIONS
Status: DISCONTINUED | OUTPATIENT
Start: 2017-07-06 | End: 2017-07-06 | Stop reason: HOSPADM

## 2017-07-06 RX ORDER — ONDANSETRON 2 MG/ML
4 INJECTION INTRAMUSCULAR; INTRAVENOUS EVERY 30 MIN PRN
Status: DISCONTINUED | OUTPATIENT
Start: 2017-07-06 | End: 2017-07-06 | Stop reason: HOSPADM

## 2017-07-06 RX ORDER — OXYCODONE HYDROCHLORIDE 5 MG/1
5-10 TABLET ORAL EVERY 6 HOURS PRN
Qty: 35 TABLET | Refills: 0 | Status: SHIPPED | OUTPATIENT
Start: 2017-07-06 | End: 2017-07-18

## 2017-07-06 RX ORDER — FENTANYL CITRATE 50 UG/ML
INJECTION, SOLUTION INTRAMUSCULAR; INTRAVENOUS PRN
Status: DISCONTINUED | OUTPATIENT
Start: 2017-07-06 | End: 2017-07-06

## 2017-07-06 RX ORDER — CEFAZOLIN SODIUM 2 G/100ML
2 INJECTION, SOLUTION INTRAVENOUS
Status: COMPLETED | OUTPATIENT
Start: 2017-07-06 | End: 2017-07-06

## 2017-07-06 RX ORDER — LIDOCAINE HYDROCHLORIDE 20 MG/ML
INJECTION, SOLUTION INFILTRATION; PERINEURAL PRN
Status: DISCONTINUED | OUTPATIENT
Start: 2017-07-06 | End: 2017-07-06

## 2017-07-06 RX ADMIN — SODIUM CHLORIDE 3000 ML: 900 IRRIGANT IRRIGATION at 12:40

## 2017-07-06 RX ADMIN — PHENYLEPHRINE HYDROCHLORIDE 100 MCG: 10 INJECTION, SOLUTION INTRAMUSCULAR; INTRAVENOUS; SUBCUTANEOUS at 12:58

## 2017-07-06 RX ADMIN — PHENYLEPHRINE HYDROCHLORIDE 50 MCG: 10 INJECTION, SOLUTION INTRAMUSCULAR; INTRAVENOUS; SUBCUTANEOUS at 13:23

## 2017-07-06 RX ADMIN — PHENYLEPHRINE HYDROCHLORIDE 100 MCG: 10 INJECTION, SOLUTION INTRAMUSCULAR; INTRAVENOUS; SUBCUTANEOUS at 12:41

## 2017-07-06 RX ADMIN — PROPOFOL 30 MG: 10 INJECTION, EMULSION INTRAVENOUS at 13:29

## 2017-07-06 RX ADMIN — LIDOCAINE HYDROCHLORIDE 80 MG: 20 INJECTION, SOLUTION INFILTRATION; PERINEURAL at 12:37

## 2017-07-06 RX ADMIN — SODIUM CHLORIDE, POTASSIUM CHLORIDE, SODIUM LACTATE AND CALCIUM CHLORIDE: 600; 310; 30; 20 INJECTION, SOLUTION INTRAVENOUS at 12:34

## 2017-07-06 RX ADMIN — SODIUM CHLORIDE 1000 ML: 0.9 IRRIGANT IRRIGATION at 12:35

## 2017-07-06 RX ADMIN — FENTANYL CITRATE 100 MCG: 50 INJECTION, SOLUTION INTRAMUSCULAR; INTRAVENOUS at 12:37

## 2017-07-06 RX ADMIN — CEFAZOLIN SODIUM 2 G: 2 INJECTION, SOLUTION INTRAVENOUS at 12:44

## 2017-07-06 RX ADMIN — PHENYLEPHRINE HYDROCHLORIDE 100 MCG: 10 INJECTION, SOLUTION INTRAMUSCULAR; INTRAVENOUS; SUBCUTANEOUS at 13:28

## 2017-07-06 RX ADMIN — MIDAZOLAM HYDROCHLORIDE 2 MG: 1 INJECTION, SOLUTION INTRAMUSCULAR; INTRAVENOUS at 12:36

## 2017-07-06 RX ADMIN — PROPOFOL 200 MG: 10 INJECTION, EMULSION INTRAVENOUS at 12:37

## 2017-07-06 RX ADMIN — DEXAMETHASONE SODIUM PHOSPHATE 4 MG: 4 INJECTION, SOLUTION INTRA-ARTICULAR; INTRALESIONAL; INTRAMUSCULAR; INTRAVENOUS; SOFT TISSUE at 12:44

## 2017-07-06 RX ADMIN — FENTANYL CITRATE 50 MCG: 50 INJECTION, SOLUTION INTRAMUSCULAR; INTRAVENOUS at 13:16

## 2017-07-06 RX ADMIN — ONDANSETRON 4 MG: 2 INJECTION INTRAMUSCULAR; INTRAVENOUS at 13:21

## 2017-07-06 ASSESSMENT — ENCOUNTER SYMPTOMS
DYSRHYTHMIAS: 0
SEIZURES: 0

## 2017-07-06 ASSESSMENT — LIFESTYLE VARIABLES: TOBACCO_USE: 0

## 2017-07-06 ASSESSMENT — COPD QUESTIONNAIRES: COPD: 0

## 2017-07-06 NOTE — IP AVS SNAPSHOT
MRN:[id number]                      After Visit Summary   7/6/2017    Faye Pugh    MRN: [id number]           Thank you!     Thank you for choosing [facility] for your care. Our goal is always to provide you with excellent care. Hearing back from our patients is one way we can continue to improve our services. Please take a few minutes to complete the written survey that you may receive in the mail after you visit with us. Thank you!        Patient Information     Date Of Birth          1980        About your hospital stay     You were admitted on:  July 6, 2017 You last received care in the:  [facility] PACU    You were discharged on:  July 6, 2017       Who to Call     For medical emergencies, please call 911.  For non-urgent questions about your medical care, please call your primary care provider or clinic, [phone]  For questions related to your surgery, please call your surgery clinic        Attending Provider     Provider Amanda Dowell MD Urology       Primary Care Provider Office Phone # Fax #    Vincent Brenner [phone]      After Care Instructions     Diet Instructions       Resume pre procedure diet            Discharge Instructions       Stents  - You have a ureteral stent in place. Stents can cause some discomfort, including some blood in your urine (which is normal), the feeling or urge to urinate frequently, and pressure/discomfort in your back while voiding. Stay well hydrated to keep your urine as clear as possible.    Medications  - Transition from narcotic pain medications to tylenol (acetaminophen) as you are able.  Wean yourself off all pain medications as you are able.  - Some pain medications contain both tylenol (acetaminophen) and a narcotic (Norco, vicodin, percocet), do not take more than 4,000mg of Tylenol (acetaminophen) from all sources in any 24 hour period.  - Narcotics can make you constipated.  Take over  "the counter fiber (metamucil or benefiber) and stool softeners (miralax, docusate or senna) while taking narcotic pain medications, but stop if you develop diarrhea.  - No driving or operating machinery while taking narcotic pain medications     Follow-Up:  - With Dr. Martinez for your stent removal  - Call your primary care provider to touch base regarding your recent admission.    - Call or return sooner than your regularly scheduled visit if you develop any of the following: fever (greater than 101.5), uncontrolled pain, uncontrolled nausea or vomiting, as well as increased redness, swelling, or drainage from your wound.     Phone numbers:   - Monday through Friday 8am to 4:30pm: Call [phone] with questions or to schedule or confirm appointment.    - Nights or weekends: call the after hours emergency pager - [phone] and tell the  \"I would like to page the Urology Resident on call.\"  - For emergencies, call [phone]            Encourage fluids       Encourage fluids at home to keep urine clear to light pink                  Your next 10 appointments already scheduled     Jul 18, 2017  3:00 PM CDT   Return Visit with Amanda Martinez MD, [facility] ([facility])    [address]  [address]  [facility] [address] [phone]              Further instructions from your care team           Cystoscopy and  Holmium Laser Discharge Instructions    Holmium laser lithotripsy was used to break up your kidney stone(s). It is normal to have visible blood in the urine, burning, urgency and frequency following this procedure. These symptoms may last for a few days to weeks.     Diet:    To help pass stone fragments and clear the blood out of the urine, it is important to drink 6-8 glasses of fluids per day at home - at least 3-4 glasses should be water.       Return to the diet that you were on before the procedure, unless you are given " specific diet instructions.    Activity:    Walk short distances and increase as your strength allows.    You may climb stairs.    Do not do strenuous exercise or heavy lifting until approved by surgeon.    Do not drive while taking narcotic pain medications.    Bathing:    You may take a shower.           Call your physician if these signs/symptoms are present:    Pain that is not relieved by a short rest or ordered pain medications.    Temperature at or above 101.0 F or chills.    Inability or difficulty urinating.     Excessive blood in urine.    Any questions or concerns.          Same Day Surgery Discharge Instructions for  Sedation and General Anesthesia       It's not unusual to feel dizzy, light-headed or faint for up to 24 hours after surgery or while taking pain medication.  If you have these symptoms: sit for a few minutes before standing and have someone assist you when you get up to walk or use the bathroom.      You should rest and relax for the next 24 hours. We recommend you make arrangements to have an adult stay with you for at least 24 hours after your discharge.  Avoid hazardous and strenuous activity.      DO NOT DRIVE any vehicle or operate mechanical equipment for 24 hours following the end of your surgery.  Even though you may feel normal, your reactions may be affected by the medication you have received.      Do not drink alcoholic beverages for 24 hours following surgery.       Slowly progress to your regular diet as you feel able. It's not unusual to feel nauseated and/or vomit after receiving anesthesia.  If you develop these symptoms, drink clear liquids (apple juice, ginger ale, broth, 7-up, etc. ) until you feel better.  If your nausea and vomiting persists for 24 hours, please notify your surgeon.        All narcotic pain medications, along with inactivity and anesthesia, can cause constipation. Drinking plenty of liquids and increasing fiber intake will help.      For any questions  "of a medical nature, call your surgeon.      Do not make important decisions for 24 hours.      If you had general anesthesia, you may have a sore throat for a couple of days related to the breathing tube used during surgery.  You may use Cepacol lozenges to help with this discomfort.  If it worsens or if you develop a fever, contact your surgeon.       If you feel your pain is not well managed with the pain medications prescribed by your surgeon, please contact your surgeon's office to let them know so they can address your concerns.       **If you have questions or concerns about your procedure,   call Dr. Martinez [phone]**          Pending Results     Date and Time Order Name Status Description    7/6/2017 1358 XR Surgery NEAL Fluoro L/T 5 Min w Stills In process             Statement of Approval     Ordered          07/06/17 0997  I have reviewed and agree with all the recommendations and orders detailed in this document.  EFFECTIVE NOW     Approved and electronically signed by:  Rohan Mensah MD             Admission Information     Date & Time Provider Department Dept. Phone    7/6/2017 Amanda Martinez MD [facility] PACU [phone]      Your Vitals Were     Blood Pressure Pulse Temperature Respirations Height Weight    112/80 77 95.9  F (35.5  C) 14 1.6 m (5' 3\") 55.4 kg (122 lb 3.2 oz)    Pulse Oximetry BMI (Body Mass Index)                100% 21.65 kg/m2          MyChart Information     [employer] gives you secure access to your electronic health record. If you see a primary care provider, you can also send messages to your care team and make appointments. If you have questions, please call your primary care clinic.  If you do not have a primary care provider, please call [phone] and they will assist you.        Care EveryWhere ID     This is your Care EveryWhere ID. This could be used by other organizations to access your [facility] medical records  [id number]        Equal " Access to Services     [facility]: Hadii aad darryn rylee Solia, waaxda luqadaha, qaybta kaalmada riddhipetrkign, kimberly arreguin. So [facility] [phone].    ATENCIÓN: Si dorala monster, tiene a nguyen disposición servicios gratuitos de asistencia lingüística. Llame al [phone].    We comply with applicable federal civil rights laws and [place] laws. We do not discriminate on the basis of race, color, national origin, age, disability sex, sexual orientation or gender identity.               Review of your medicines      START taking        Dose / Directions    docusate sodium 100 MG tablet   Commonly known as:  COLACE   Used for:  Kidney stone        Dose:  100 mg   Take 100 mg by mouth daily   Quantity:  60 tablet   Refills:  0       tamsulosin 0.4 MG capsule   Commonly known as:  FLOMAX   Used for:  Kidney stone        Dose:  0.4 mg   Take 1 capsule (0.4 mg) by mouth daily for 14 days   Quantity:  14 capsule   Refills:  0         CONTINUE these medicines which have NOT CHANGED        Dose / Directions    ibuprofen 200 MG tablet   Commonly known as:  ADVIL/MOTRIN        Refills:  0       oxyCODONE 5 MG IR tablet   Commonly known as:  ROXICODONE   Used for:  Kidney stone        Dose:  5-10 mg   Take 1-2 tablets (5-10 mg) by mouth every 6 hours as needed for pain   Quantity:  35 tablet   Refills:  0       phenazopyridine 200 MG tablet   Commonly known as:  PYRIDIUM   Used for:  Kidney stone        Dose:  200 mg   Take 1 tablet (200 mg) by mouth 3 times daily as needed for irritation Expect your urine to appear bright orange   Quantity:  9 tablet   Refills:  0         STOP taking     gabapentin 100 MG capsule   Commonly known as:  NEURONTIN                Where to get your medicines      These medications were sent to [facility] - [address]  [address] [place] [address]     Phone:  [phone]     docusate sodium 100 MG tablet     phenazopyridine 200 MG tablet    tamsulosin 0.4 MG capsule         Some of these will need a paper prescription and others can be bought over the counter. Ask your nurse if you have questions.     Bring a paper prescription for each of these medications     oxyCODONE 5 MG IR tablet                Protect others around you: Learn how to safely use, store and throw away your medicines at www.disposemymeds.org.             Medication List: This is a list of all your medications and when to take them. Check marks below indicate your daily home schedule. Keep this list as a reference.      Medications           Morning Afternoon Evening Bedtime As Needed    docusate sodium 100 MG tablet   Commonly known as:  COLACE   Take 100 mg by mouth daily                                ibuprofen 200 MG tablet   Commonly known as:  ADVIL/MOTRIN                                oxyCODONE 5 MG IR tablet   Commonly known as:  ROXICODONE   Take 1-2 tablets (5-10 mg) by mouth every 6 hours as needed for pain                                phenazopyridine 200 MG tablet   Commonly known as:  PYRIDIUM   Take 1 tablet (200 mg) by mouth 3 times daily as needed for irritation Expect your urine to appear bright orange                                tamsulosin 0.4 MG capsule   Commonly known as:  FLOMAX   Take 1 capsule (0.4 mg) by mouth daily for 14 days

## 2017-07-06 NOTE — OP NOTE
DATE OF SERVICE: 07/06/17   PREOPERATIVE DIAGNOSIS: Left ureteral stone  POSTOPERATIVE DIAGNOSIS: Left ureteral stone    PROCEDURES PERFORMED:   1. Cystourethroscopy  2. Left semi-rigid ureteroscopy  3. Holmium laser lithotripsy with basket stone extraction  4. Left ureteral stent placement    STAFF SURGEON: Dr. Amanda Villegas, present for the entire case.   ASSISTANT: Rohan Mensah MD  ANESTHESIA: General    ESTIMATED BLOOD LOSS: 5 cc  DRAINS/TUBES: 6 Cameroonian x 24 cm double-J ureteral stent    IV FLUIDS: Please see dictated anesthesia record  COMPLICATIONS: None.   SPECIMEN: Stones for analysis  SIGNIFICANT FINDINGS: Proximal curl of the ureteral stent seen in the renal pelvis under fluoroscopy and distal curl seen in the bladder under direct vision.     BRIEF OPERATIVE INDICATIONS: Faye Pugh is a 37 year old gentleman with a history of a 6 mm left distal ureteral stone. He has previously undergone lefty ureteral stent placement. After a discussion of all risks, benefits, and alternatives, the patient elected to proceed with definitive stone management. The patient understands the potential need for more than one procedure to eliminate all stone burden.     DESCRIPTION OF PROCEDURE:  After informed consent was obtained, the patient was transported to the operating room, placed supine on the table. After adequate anesthesia was induced, he was placed in lithotomy and prepped and draped in the usual sterile fashion. A timeout was taken to confirm correct patient, procedure and laterality. Pre-operative IV antibiotics were administered.     A 22 Cameroonian rigid cystoscope was inserted per a well-lubricated urethra. The anterior urethra was unremarkable, and the prostate showed moderate lateral lobe hypertrophy. The left ureteral orifice and had a stent curled out of it. We cannulated alongside it with a Sensor wire using the aid of a 5 Cameroonian open-ended catheter. The wire passed without resistance into the  upper pole. A semi-rigid ureteroscope was then advanced to the distal ureter, where the stone was visualized. We attempted to basket the stone out but there was resistance, and as such we elected to perform laser lithotripsy. A 200 micron laser fiber was used at a setting of 0.6 J and 6 Hz, and lithotripsy was performed. A Halo basket was used to remove all fragments greater than 1 mm. Pullback ureteroscopy was performed and showed no retained stone fragments. A 6 Fr x 24 cm double-J stent was advanced over the Sensor wire, and a good proximal curl was seen in the renal pelvis and the distal curl was seen in the bladder. The bladder was drained. Stone fragments were evacuated from the bladder.  Sathia tolerated the procedure well.  No apparent complications. The patient  was transported to the postanesthesia care unit in stable condition.     PLAN: Return in 10 days for stent removal.

## 2017-07-06 NOTE — DISCHARGE INSTRUCTIONS
Cystoscopy and  Holmium Laser Discharge Instructions    Holmium laser lithotripsy was used to break up your kidney stone(s). It is normal to have visible blood in the urine, burning, urgency and frequency following this procedure. These symptoms may last for a few days to weeks.     Diet:    To help pass stone fragments and clear the blood out of the urine, it is important to drink 6-8 glasses of fluids per day at home - at least 3-4 glasses should be water.       Return to the diet that you were on before the procedure, unless you are given specific diet instructions.    Activity:    Walk short distances and increase as your strength allows.    You may climb stairs.    Do not do strenuous exercise or heavy lifting until approved by surgeon.    Do not drive while taking narcotic pain medications.    Bathing:    You may take a shower.           Call your physician if these signs/symptoms are present:    Pain that is not relieved by a short rest or ordered pain medications.    Temperature at or above 101.0 F or chills.    Inability or difficulty urinating.     Excessive blood in urine.    Any questions or concerns.          Same Day Surgery Discharge Instructions for  Sedation and General Anesthesia       It's not unusual to feel dizzy, light-headed or faint for up to 24 hours after surgery or while taking pain medication.  If you have these symptoms: sit for a few minutes before standing and have someone assist you when you get up to walk or use the bathroom.      You should rest and relax for the next 24 hours. We recommend you make arrangements to have an adult stay with you for at least 24 hours after your discharge.  Avoid hazardous and strenuous activity.      DO NOT DRIVE any vehicle or operate mechanical equipment for 24 hours following the end of your surgery.  Even though you may feel normal, your reactions may be affected by the medication you have received.      Do not drink alcoholic beverages for 24  hours following surgery.       Slowly progress to your regular diet as you feel able. It's not unusual to feel nauseated and/or vomit after receiving anesthesia.  If you develop these symptoms, drink clear liquids (apple juice, ginger ale, broth, 7-up, etc. ) until you feel better.  If your nausea and vomiting persists for 24 hours, please notify your surgeon.        All narcotic pain medications, along with inactivity and anesthesia, can cause constipation. Drinking plenty of liquids and increasing fiber intake will help.      For any questions of a medical nature, call your surgeon.      Do not make important decisions for 24 hours.      If you had general anesthesia, you may have a sore throat for a couple of days related to the breathing tube used during surgery.  You may use Cepacol lozenges to help with this discomfort.  If it worsens or if you develop a fever, contact your surgeon.       If you feel your pain is not well managed with the pain medications prescribed by your surgeon, please contact your surgeon's office to let them know so they can address your concerns.       **If you have questions or concerns about your procedure,   call Dr. Martinez [phone]**

## 2017-07-06 NOTE — ANESTHESIA PREPROCEDURE EVALUATION
Procedure: Procedure(s):  COMBINED CYSTOSCOPY, URETEROSCOPY, LASER HOLMIUM LITHOTRIPSY URETER(S), INSERT STENT  Preop diagnosis: LEFT URETERAL STONE     No Known Allergies  History reviewed. No pertinent past medical history.  Past Surgical History:   Procedure Laterality Date     CYSTOSCOPY, RETROGRADES, INSERT STENT URETER(S), COMBINED  6/15/2017    Procedure: COMBINED CYSTOSCOPY, RETROGRADES, INSERT STENT URETER(S);  CYSTOSCOPY, LEFT URETEROSCOPY, LEFT STENT PLACEMENT ;  Surgeon: Amanda Martinez MD;  Location:  OR     Prior to Admission medications    Medication Sig Start Date End Date Taking? Authorizing Provider   phenazopyridine (PYRIDIUM) 200 MG tablet Take 1 tablet (200 mg) by mouth 3 times daily as needed for irritation Expect your urine to appear bright orange 6/15/17  Yes Amanda Martinez MD   oxyCODONE (ROXICODONE) 5 MG IR tablet Take 1-2 tablets (5-10 mg) by mouth every 6 hours as needed for pain 6/15/17   Amanda Martinez MD   gabapentin (NEURONTIN) 100 MG capsule Take 1 capsule (100 mg) by mouth 3 times daily for 14 days 6/15/17 6/29/17  mAanda Martinez MD   ibuprofen (ADVIL/MOTRIN) 200 MG tablet  5/14/17   Reported, Patient     Current Facility-Administered Medications Ordered in Epic   Medication Dose Route Frequency Last Rate Last Dose     ceFAZolin sodium-dextrose (ANCEF) infusion 2 g  2 g Intravenous Pre-Op/Pre-procedure x 1 dose         ceFAZolin (ANCEF) 1 g vial to attach to  ml bag for ADULT or 50 ml bag for PEDS  1 g Intravenous See Admin Instructions         No current [facility]-ordered outpatient prescriptions on file.     Wt Readings from Last 1 Encounters:   07/06/17 55.4 kg (122 lb 3.2 oz)     Temp Readings from Last 1 Encounters:   07/06/17 36.1  C (96.9  F) (Oral)     BP Readings from Last 6 Encounters:   07/06/17 123/85   06/15/17 98/61   05/30/17 110/70   05/16/17 140/82   05/14/17 128/87     Pulse Readings from Last 4 Encounters:   07/06/17  77   06/15/17 79   05/30/17 87   05/16/17 80     Resp Readings from Last 1 Encounters:   07/06/17 16     SpO2 Readings from Last 1 Encounters:   07/06/17 98%     Recent Labs   Lab Test  05/14/17   0704   NA  140   POTASSIUM  4.0   CHLORIDE  107   CO2  26   ANIONGAP  7   GLC  155*   BUN  17   CR  0.88   JONNY  9.0     Recent Labs   Lab Test  05/14/17   0704   WBC  10.2   HGB  16.3   PLT  235         Anesthesia Evaluation     . Pt has had prior anesthetic. Type: General    No history of anesthetic complications          ROS/MED HX    ENT/Pulmonary:      (-) tobacco use, asthma, COPD and sleep apnea   Neurologic:      (-) seizures, CVA, TIA and migraines   Cardiovascular:        (-) hypertension, CAD, arrhythmias, valvular problems/murmurs and dyslipidemia   METS/Exercise Tolerance:  >4 METS   Hematologic:        (-) history of blood clots, anemia and other hematologic disorder   Musculoskeletal:        (-) arthritis   GI/Hepatic:        (-) GERD and liver disease   Renal/Genitourinary:     (+) Nephrolithiasis ,    (-) renal disease   Endo:      (-) Type I DM, Type II DM and thyroid disease   Psychiatric:         Infectious Disease:        (-) Recent Fever   Malignancy:         Other:                     Physical Exam  Normal systems: cardiovascular, pulmonary and dental    Airway   Mallampati: II  TM distance: >3 FB  Neck ROM: full    Dental     Cardiovascular   Rhythm and rate: regular and normal  (-) no murmur    Pulmonary    breath sounds clear to auscultation                    Anesthesia Plan      History & Physical Review      ASA Status:  3 .    NPO Status:  > 8 hours    Plan for General and LMA with Propofol induction. Maintenance will be Balanced.    PONV prophylaxis:  Ondansetron (or other 5HT-3) and Dexamethasone or Solumedrol    Background propofol infusion      Postoperative Care  Postoperative pain management:  Multi-modal analgesia.      Consents  Anesthetic plan, risks, benefits and alternatives  discussed with:  Patient..                          .

## 2017-07-06 NOTE — ANESTHESIA CARE TRANSFER NOTE
Patient: Faye Pugh    Procedure(s):  CYSTOSCOPY, LEFT URETEROSCOPY, HOLMIUM LASER LITHOTRIPSY, LEFT STENT PLACEMENT  - Wound Class: II-Clean Contaminated    Diagnosis: LEFT URETERAL STONE   Diagnosis Additional Information: No value filed.    Anesthesia Type:   General, LMA     Note:  Airway :Face Mask  Patient transferred to:PACU  Comments: Transferred to PACU, spontaneous respirations,  Opening eyes, following commands LMA removed atraumatically.  Exchanging well.  Asking appropriate queastions, VSS.  Patient awake, comfortable.  Report to PACU RN.      Vitals: (Last set prior to Anesthesia Care Transfer)    CRNA VITALS  7/6/2017 1322 - 7/6/2017 1400      7/6/2017             Pulse: 65    SpO2: 98 %    Resp Rate (set): 10                Electronically Signed By: TRISTA Doshi CRNA  July 6, 2017  2:00 PM

## 2017-07-06 NOTE — ANESTHESIA POSTPROCEDURE EVALUATION
Patient: Faye Pugh    Procedure(s):  CYSTOSCOPY, LEFT URETEROSCOPY, HOLMIUM LASER LITHOTRIPSY, LEFT STENT PLACEMENT  - Wound Class: II-Clean Contaminated    Diagnosis:LEFT URETERAL STONE   Diagnosis Additional Information: No value filed.    Anesthesia Type:  General, LMA    Note:  Anesthesia Post Evaluation    Patient location during evaluation: PACU  Patient participation: Able to fully participate in evaluation  Level of consciousness: awake and alert  Pain management: adequate  Airway patency: patent  Cardiovascular status: acceptable, blood pressure returned to baseline and hemodynamically stable  Respiratory status: acceptable  Hydration status: acceptable  PONV: none     Anesthetic complications: None          Last vitals:  Vitals:    07/06/17 1415 07/06/17 1430 07/06/17 1510   BP: 112/80 117/81 122/84   Pulse:      Resp: 14 15 16   Temp: 35.5  C (95.9  F) 35.7  C (96.2  F)    SpO2: 100% 100% 100%         Electronically Signed By: Rocio Valdez MD  July 6, 2017  6:59 PM

## 2017-07-06 NOTE — IP AVS SNAPSHOT
[place] [address]    [place] [address]    Phone:  [phone]                                       After Visit Summary   7/6/2017    Faye Pugh    MRN: [id number]           After Visit Summary Signature Page     I have received my discharge instructions, and my questions have been answered. I have discussed any challenges I see with this plan with the nurse or doctor.    ..........................................................................................................................................  Patient/Patient Representative Signature      ..........................................................................................................................................  Patient Representative Print Name and Relationship to Patient    ..................................................               ................................................  Date                                            Time    ..........................................................................................................................................  Reviewed by Signature/Title    ...................................................              ..............................................  Date                                                            Time

## 2017-07-07 LAB — COPATH REPORT: NORMAL

## 2017-07-08 LAB
APPEARANCE STONE: NORMAL
COMPN STONE: NORMAL
NUMBER STONE: 3
SIZE STONE: NORMAL MM3
WT STONE: 30 MG

## 2017-07-12 ENCOUNTER — PRE VISIT (OUTPATIENT)
Dept: UROLOGY | Facility: CLINIC | Age: 37
End: 2017-07-12

## 2017-07-18 ENCOUNTER — OFFICE VISIT (OUTPATIENT)
Dept: UROLOGY | Facility: CLINIC | Age: 37
End: 2017-07-18
Payer: COMMERCIAL

## 2017-07-18 VITALS
HEART RATE: 87 BPM | OXYGEN SATURATION: 99 % | WEIGHT: 122 LBS | SYSTOLIC BLOOD PRESSURE: 122 MMHG | DIASTOLIC BLOOD PRESSURE: 60 MMHG | HEIGHT: 63 IN | BODY MASS INDEX: 21.62 KG/M2

## 2017-07-18 DIAGNOSIS — N20.1 CALCULUS OF URETER: ICD-10-CM

## 2017-07-18 DIAGNOSIS — N20.0 KIDNEY STONE: Primary | ICD-10-CM

## 2017-07-18 LAB
ALBUMIN UR-MCNC: 30 MG/DL
APPEARANCE UR: CLEAR
BILIRUB UR QL STRIP: NEGATIVE
COLOR UR AUTO: YELLOW
GLUCOSE UR STRIP-MCNC: 100 MG/DL
HGB UR QL STRIP: ABNORMAL
KETONES UR STRIP-MCNC: NEGATIVE MG/DL
LEUKOCYTE ESTERASE UR QL STRIP: ABNORMAL
NITRATE UR QL: NEGATIVE
PH UR STRIP: 5.5 PH (ref 5–7)
SP GR UR STRIP: 1.01 (ref 1–1.03)
URN SPEC COLLECT METH UR: ABNORMAL
UROBILINOGEN UR STRIP-ACNC: 0.2 EU/DL (ref 0.2–1)

## 2017-07-18 PROCEDURE — 81003 URINALYSIS AUTO W/O SCOPE: CPT | Performed by: UROLOGY

## 2017-07-18 PROCEDURE — 99207 ZZC NO CHARGE LOS: CPT | Performed by: UROLOGY

## 2017-07-18 PROCEDURE — 52310 CYSTOSCOPY AND TREATMENT: CPT | Performed by: UROLOGY

## 2017-07-18 ASSESSMENT — PAIN SCALES - GENERAL: PAINLEVEL: NO PAIN (0)

## 2017-07-18 NOTE — PATIENT INSTRUCTIONS
"     AFTER YOUR CYSTOSCOPY         You have just completed a cystoscopy, or \"cysto\", which allowed your physician to learn more about your bladder (or to remove a stent placed after surgery). We suggest that you continue to avoid caffeine, fruit juice, and alcohol for the next 24 hours, however, you are encouraged to return to your normal activities.       A few things that are considered normal after your cystoscopy:    * small amount of bleeding (or spotting) that clears within the next 24 hours    * slight burning sensation with urination    * sensation to of needing to avoid more frequently    * the feeling of \"air\" in your urine    * mild discomfort that is relieved with Tylonol        Please contact our office promptly if you:    * develop a fever above 101 degrees    * are unable to urinate    * develop bright red blood that does not stop    * severe pain or swelling        And of course, please contact our office with any concerns or questions [phone]      [place]    "

## 2017-07-18 NOTE — LETTER
7/18/2017       RE: Faye Pugh  [address]  [facility] [address]     Dear Colleague,    Thank you for referring your patient, Faye Pugh, to the [facility] [place] at [facility]. Please see a copy of my visit note below.      ASSESSMENT AND PLAN  Ureteral Stone    CT scan in three months   24 hr urine x2   Follow up when above completed   _________________________________________________________________    CHIEF COMPLAINT  It was my pleasure to see Faye Pugh who is a 37 year old male who is here for follow-up for kidney stones and removal of his ureteral stent.    HPI   Patient with distal ureteral calculus with no progression on MET. Found to have stenotic ureter. Had stent placed and then subsequent stone clearance. Presents for stent removal.       There are no active problems to display for this patient.    History reviewed. No pertinent past medical history.  Past Surgical History:   Procedure Laterality Date     CYSTOSCOPY, RETROGRADES, INSERT STENT URETER(S), COMBINED  6/15/2017    Procedure: COMBINED CYSTOSCOPY, RETROGRADES, INSERT STENT URETER(S);  CYSTOSCOPY, LEFT URETEROSCOPY, LEFT STENT PLACEMENT ;  Surgeon: Amanda Martinez MD;  Location:  OR     LASER HOLMIUM LITHOTRIPSY URETER(S), INSERT STENT, COMBINED Left 7/6/2017    Procedure: COMBINED CYSTOSCOPY, URETEROSCOPY, LASER HOLMIUM LITHOTRIPSY URETER(S), INSERT STENT;  CYSTOSCOPY, LEFT URETEROSCOPY, HOLMIUM LASER LITHOTRIPSY, LEFT STENT PLACEMENT ;  Surgeon: Amanda Martinez MD;  Location:  OR     No current outpatient prescriptions on file.      SOCIAL HISTORY: He  reports that he has never smoked. He has never used smokeless tobacco. He reports that he does not drink alcohol or use illicit drugs.    CYSTOSCOPY PROCEDURE  After a sterile prep and drape and an informed consent a 16-Croatian flexible cystoscope was introduced via the urethra.  The urethra  was open.  The stent was visualized, grasped and removed in its entirety.            Again, thank you for allowing me to participate in the care of your patient.      Sincerely,    Amanda Martinez MD    CC  Patient Care Team:  Vincent Brenner NP as PCP - General (Nurse Practitioner)

## 2017-07-18 NOTE — MR AVS SNAPSHOT
"              After Visit Summary   7/18/2017    Faye Pugh    MRN: [id number]           Patient Information     Date Of Birth          1980        Visit Information        Provider Department      7/18/2017 9:00 AM Amanda Martinez MD [facility] [place]        Today's Diagnoses     Kidney stone    -  1    Calculus of ureter          Care Instructions         AFTER YOUR CYSTOSCOPY         You have just completed a cystoscopy, or \"cysto\", which allowed your physician to learn more about your bladder (or to remove a stent placed after surgery). We suggest that you continue to avoid caffeine, fruit juice, and alcohol for the next 24 hours, however, you are encouraged to return to your normal activities.       A few things that are considered normal after your cystoscopy:    * small amount of bleeding (or spotting) that clears within the next 24 hours    * slight burning sensation with urination    * sensation to of needing to avoid more frequently    * the feeling of \"air\" in your urine    * mild discomfort that is relieved with Tylonol        Please contact our office promptly if you:    * develop a fever above 101 degrees    * are unable to urinate    * develop bright red blood that does not stop    * severe pain or swelling        And of course, please contact our office with any concerns or questions [phone]      [place], [place]            Follow-ups after your visit        Follow-up notes from your care team     Return in about 3 months (around 10/18/2017) for CT scan and litholink x2 .      Your next 10 appointments already scheduled     Oct 19, 2017  8:30 AM CDT   CT ABDOMEN PELVIS W/O CONTRAST with SHCT1   [facility] CT ([facility])    [address] [place] [address] [phone]           Please bring any scans or X-rays taken at other hospitals, if similar tests were done. Also bring a list of your medicines, " including vitamins, minerals and over-the-counter drugs. It is safest to leave personal items at home.  Be sure to tell your doctor:   If you have any allergies.   If there s any chance you are pregnant.   If you are breastfeeding.   If you have any special needs.  You do not need to do anything special to prepare.  Please wear loose clothing, such as a sweat suit or jogging clothes. Avoid snaps, zippers and other metal. We may ask you to undress and put on a hospital gown.            Oct 19, 2017  9:45 AM CDT   Return Visit with Amanda Martinez MD   [facility] [place] ([facility])    [address]  [address]  [facility] [address] [phone]              Future tests that were ordered for you today     Open Future Orders        Priority Expected Expires Ordered    CT Abdomen pelvis w/o contrast Routine  7/18/2018 7/18/2017            Who to contact     If you have questions or need follow up information about today's clinic visit or your schedule please contact [facility] [place] directly at [phone].  Normal or non-critical lab and imaging results will be communicated to you by "[employer]hart, letter or phone within 4 business days after the clinic has received the results. If you do not hear from us within 7 days, please contact the clinic through [employer] or phone. If you have a critical or abnormal lab result, we will notify you by phone as soon as possible.  Submit refill requests through [employer]â„¢ or call your pharmacy and they will forward the refill request to us. Please allow 3 business days for your refill to be completed.          Additional Information About Your Visit        [employer] Information     [employer] gives you secure access to your electronic health record. If you see a primary care provider, you can also send messages to your care team and make appointments. If you have questions, please call your primary  "care clinic.  If you do not have a primary care provider, please call [phone] and they will assist you.        Care EveryWhere ID     This is your Care EveryWhere ID. This could be used by other organizations to access your [place] medical records  [id number]        Your Vitals Were     Pulse Height Pulse Oximetry BMI (Body Mass Index)          87 1.6 m (5' 3\") 99% 21.61 kg/m2         Blood Pressure from Last 3 Encounters:   07/18/17 122/60   07/06/17 122/84   06/15/17 98/61    Weight from Last 3 Encounters:   07/18/17 55.3 kg (122 lb)   07/06/17 55.4 kg (122 lb 3.2 oz)   06/15/17 55.8 kg (123 lb)              We Performed the Following     CYSTOSCOPY W FOREIGN BODY REMOVAL, SIMPLE     UA without Microscopic        Primary Care Provider Office Phone # Fax #    Vincent Brenner [phone]       [facility] [address] [place] [address]        Equal Access to Services     [facility]: Hadii aad ku hadasho Soomaali, waaxda luqadaha, qaybta kaalmada adeegyada, waxay humberto meyers . So [facility] [phone].    ATENCIÓN: Si habla español, tiene a nguyen disposición servicios gratuitos de asistencia lingüística. Llame al [phone].    We comply with applicable federal civil rights laws and [place] laws. We do not discriminate on the basis of race, color, national origin, age, disability sex, sexual orientation or gender identity.            Thank you!     Thank you for choosing [facility] [place]  for your care. Our goal is always to provide you with excellent care. Hearing back from our patients is one way we can continue to improve our services. Please take a few minutes to complete the written survey that you may receive in the mail after your visit with us. Thank you!             Your Updated Medication List - Protect others around you: Learn how to safely use, store and throw away your medicines at " www.disposemymeds.org.      Notice  As of 7/18/2017  9:37 AM    You have not been prescribed any medications.

## 2017-07-18 NOTE — NURSING NOTE

## 2017-07-18 NOTE — PROGRESS NOTES
ASSESSMENT AND PLAN  Ureteral Stone    CT scan in three months   24 hr urine x2   Follow up when above completed   _________________________________________________________________    CHIEF COMPLAINT  It was my pleasure to see Faye Pugh who is a 37 year old male who is here for follow-up for kidney stones and removal of his ureteral stent.    HPI   Patient with distal ureteral calculus with no progression on MET. Found to have stenotic ureter. Had stent placed and then subsequent stone clearance. Presents for stent removal.       There are no active problems to display for this patient.    History reviewed. No pertinent past medical history.  Past Surgical History:   Procedure Laterality Date     CYSTOSCOPY, RETROGRADES, INSERT STENT URETER(S), COMBINED  6/15/2017    Procedure: COMBINED CYSTOSCOPY, RETROGRADES, INSERT STENT URETER(S);  CYSTOSCOPY, LEFT URETEROSCOPY, LEFT STENT PLACEMENT ;  Surgeon: Amanda Martinez MD;  Location:  OR     LASER HOLMIUM LITHOTRIPSY URETER(S), INSERT STENT, COMBINED Left 7/6/2017    Procedure: COMBINED CYSTOSCOPY, URETEROSCOPY, LASER HOLMIUM LITHOTRIPSY URETER(S), INSERT STENT;  CYSTOSCOPY, LEFT URETEROSCOPY, HOLMIUM LASER LITHOTRIPSY, LEFT STENT PLACEMENT ;  Surgeon: Amanda Martinez MD;  Location:  OR     No current outpatient prescriptions on file.      SOCIAL HISTORY: He  reports that he has never smoked. He has never used smokeless tobacco. He reports that he does not drink alcohol or use illicit drugs.    CYSTOSCOPY PROCEDURE  After a sterile prep and drape and an informed consent a 16-Setswana flexible cystoscope was introduced via the urethra.  The urethra was open.  The stent was visualized, grasped and removed in its entirety.      CC  Patient Care Team:  Viviane Fenton, NP as PCP - General (Nurse Practitioner)  VIVIANE FENTON

## 2017-10-02 ENCOUNTER — TRANSFERRED RECORDS (OUTPATIENT)
Dept: HEALTH INFORMATION MANAGEMENT | Facility: CLINIC | Age: 37
End: 2017-10-02

## 2017-10-19 ENCOUNTER — HOSPITAL ENCOUNTER (OUTPATIENT)
Dept: CT IMAGING | Facility: CLINIC | Age: 37
Discharge: HOME | End: 2017-10-19
Attending: UROLOGY | Admitting: UROLOGY
Payer: COMMERCIAL

## 2017-10-19 ENCOUNTER — OFFICE VISIT (OUTPATIENT)
Dept: UROLOGY | Facility: CLINIC | Age: 37
End: 2017-10-19
Payer: COMMERCIAL

## 2017-10-19 VITALS
HEART RATE: 60 BPM | WEIGHT: 125 LBS | SYSTOLIC BLOOD PRESSURE: 122 MMHG | HEIGHT: 63 IN | BODY MASS INDEX: 22.15 KG/M2 | DIASTOLIC BLOOD PRESSURE: 70 MMHG

## 2017-10-19 DIAGNOSIS — N20.0 KIDNEY STONE: ICD-10-CM

## 2017-10-19 DIAGNOSIS — N20.1 CALCULUS OF URETER: ICD-10-CM

## 2017-10-19 DIAGNOSIS — N20.1 CALCULUS OF URETER: Primary | ICD-10-CM

## 2017-10-19 PROCEDURE — 74176 CT ABD & PELVIS W/O CONTRAST: CPT

## 2017-10-19 PROCEDURE — 99213 OFFICE O/P EST LOW 20 MIN: CPT | Performed by: UROLOGY

## 2017-10-19 ASSESSMENT — PAIN SCALES - GENERAL: PAINLEVEL: NO PAIN (0)

## 2017-10-19 NOTE — MR AVS SNAPSHOT
After Visit Summary   10/19/2017    Faye Pugh    MRN: [id number]           Patient Information     Date Of Birth          1980        Visit Information        Provider Department      10/19/2017 9:45 AM Amanda Martinez MD [facility] [place]         Follow-ups after your visit        Follow-up notes from your care team     Return in about 3 months (around 1/19/2018).      Your next 10 appointments already scheduled     Jan 23, 2018  3:00 PM CST   Return Visit with Amanda Martinez MD   [facility] [place] ([facility])    [address]  [address]  [facility] [id number] [phone]              Who to contact     If you have questions or need follow up information about today's clinic visit or your schedule please contact [facility] UROLOGY [facility] [place] directly at [phone].  Normal or non-critical lab and imaging results will be communicated to you by MyChart, letter or phone within 4 business days after the clinic has received the results. If you do not hear from us within 7 days, please contact the clinic through [employer] or phone. If you have a critical or abnormal lab result, we will notify you by phone as soon as possible.  Submit refill requests through [employer] or call your pharmacy and they will forward the refill request to us. Please allow 3 business days for your refill to be completed.          Additional Information About Your Visit        MyChart Information     [employer] gives you secure access to your electronic health record. If you see a primary care provider, you can also send messages to your care team and make appointments. If you have questions, please call your primary care clinic.  If you do not have a primary care provider, please call [phone] and they will assist you.        Care EveryWhere ID     This is your Care EveryWhere ID. This could be  "used by other organizations to access your [place] medical records  [id number]        Your Vitals Were     Pulse Height BMI (Body Mass Index)             60 1.6 m (5' 3\") 22.14 kg/m2          Blood Pressure from Last 3 Encounters:   10/19/17 122/70   07/18/17 122/60   07/06/17 122/84    Weight from Last 3 Encounters:   10/19/17 56.7 kg (125 lb)   07/18/17 55.3 kg (122 lb)   07/06/17 55.4 kg (122 lb 3.2 oz)              Today, you had the following     No orders found for display       Primary Care Provider Office Phone # Fax #    Vincent BrennerMARITO [phone]       [facility] [address] [place] [address]        Equal Access to Services     [facility] : Hadii aad ku hadasho Solia, waaxda luqadaha, qaybta kaalmada adeegyada, kimberly meyers . So [facility] [phone].    ATENCIÓN: Si habla español, tiene a nguyen disposición servicios gratuitos de asistencia lingüística. Awa al [phone].    We comply with applicable federal civil rights laws and [place] laws. We do not discriminate on the basis of race, color, national origin, age, disability, sex, sexual orientation, or gender identity.            Thank you!     Thank you for choosing [facility]OLOGY CLIN[facility] [place]  for your care. Our goal is always to provide you with excellent care. Hearing back from our patients is one way we can continue to improve our services. Please take a few minutes to complete the written survey that you may receive in the mail after your visit with us. Thank you!             Your Updated Medication List - Protect others around you: Learn how to safely use, store and throw away your medicines at www.disposemymeds.org.      Notice  As of 10/19/2017 10:55 AM    You have not been prescribed any medications.      "

## 2017-10-19 NOTE — LETTER
10/19/2017       RE: Faye Pugh  [address]  [facility] [address]     Dear Colleague,    Thank you for referring your patient, Faye Pugh, to the [facility] [place] [facility] Please see a copy of my visit note below.    UROLOGIC DIAGNOSES:       CURRENT INTERVENTIONS:       HISTORY:     Patient presents for evaluation and management of left distal ureteral calculus.   Patient presented to the ED with pain on 05/14/2017. Found on CT scan to have a ~ 6mm ureteral calculus in the distal left ureter.   He notes that his pain is controlled with scheduled ibuprofen and some oxycodone, but he has not had pain over the last five days.     Patient s/p ureteroscopy for clearance (after initial stent placement for ureteral stenosis).   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   We discussed increasing dietary citrate as well as lowering sodium in the diet             PAST MEDICAL HISTORY: History reviewed. No pertinent past medical history.    PAST SURGICAL HISTORY:   Past Surgical History:   Procedure Laterality Date     CYSTOSCOPY, RETROGRADES, INSERT STENT URETER(S), COMBINED  6/15/2017    Procedure: COMBINED CYSTOSCOPY, RETROGRADES, INSERT STENT URETER(S);  CYSTOSCOPY, LEFT URETEROSCOPY, LEFT STENT PLACEMENT ;  Surgeon: Amanda Martinez MD;  Location:  OR     LASER HOLMIUM LITHOTRIPSY URETER(S), INSERT STENT, COMBINED Left 7/6/2017    Procedure: COMBINED CYSTOSCOPY, URETEROSCOPY, LASER HOLMIUM LITHOTRIPSY URETER(S), INSERT STENT;  CYSTOSCOPY, LEFT URETEROSCOPY, HOLMIUM LASER LITHOTRIPSY, LEFT STENT PLACEMENT ;  Surgeon: Amanda Martinez MD;  Location:  OR       FAMILY HISTORY: History reviewed. No pertinent family history.    SOCIAL HISTORY:   Social History   Substance Use Topics     Smoking status: Never Smoker     Smokeless tobacco: Never  "Used     Alcohol use No       No current outpatient prescriptions on file.     No current facility-administered medications for this visit.          PHYSICAL EXAM:    /70 (BP Location: Left arm, Patient Position: Sitting, Cuff Size: Adult Regular)  Pulse 60  Ht 1.6 m (5' 3\")  Wt 56.7 kg (125 lb)  BMI 22.14 kg/m2    HEENT: Normocephalic and atraumatic   Cardiac: Not done  Back/Flank: Not done  CNS/PNS: Not done  Respiratory: Normal non-labored breathing  Abdomen: Soft nontender and nondistended  Peripheral Vascular: Not done  Mental Status: Not done    Penis: Not done  Scrotal Skin: Not done  Testicles: Not done  Epididymis: Not done  Digital Rectal Exam:     Cystoscopy: Not done    Imaging: CT scans from ED visit and from today reviewed with the patient     Urinalysis: UA RESULTS:  Recent Labs   Lab Test  05/30/17   1557   05/14/17   0755   COLOR  Yellow   < >  Yellow   APPEARANCE  Clear   < >  Slightly Cloudy   URINEGLC  Negative   < >  Negative   URINEBILI  Negative   < >  Negative   URINEKETONE  Negative   < >  Negative   SG  1.015   < >  1.013   UBLD  Negative   < >  Large*   URINEPH  7.0   < >  6.0   PROTEIN  Negative   < >  Negative   UROBILINOGEN  0.2   < >   --    NITRITE  Negative   < >  Negative   LEUKEST  Negative   < >  Negative   RBCU   --    --   101*   WBCU   --    --   1    < > = values in this interval not displayed.       PSA:     Post Void Residual:     Other labs: None today      IMPRESSION:  38 y/o M with distal ureteral calculus on the left s/p ureteroscopy for clearance    PLAN:  Increase dietary citrate   Decrease dietary sodium   Repeat 24 hr urine x1 in three months   Follow up in three months     Total Time: 15 minutes                                       Total in Consultation: greater than 50%       Again, thank you for allowing me to participate in the care of your patient.      Sincerely,    Amanda Martinez MD      "

## 2017-10-23 NOTE — PROGRESS NOTES
"UROLOGIC DIAGNOSES:       CURRENT INTERVENTIONS:       HISTORY:     Patient presents for evaluation and management of left distal ureteral calculus.   Patient presented to the ED with pain on 05/14/2017. Found on CT scan to have a ~ 6mm ureteral calculus in the distal left ureter.   He notes that his pain is controlled with scheduled ibuprofen and some oxycodone, but he has not had pain over the last five days.     Patient s/p ureteroscopy for clearance (after initial stent placement for ureteral stenosis).   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   We discussed increasing dietary citrate as well as lowering sodium in the diet             PAST MEDICAL HISTORY: History reviewed. No pertinent past medical history.    PAST SURGICAL HISTORY:   Past Surgical History:   Procedure Laterality Date     CYSTOSCOPY, RETROGRADES, INSERT STENT URETER(S), COMBINED  6/15/2017    Procedure: COMBINED CYSTOSCOPY, RETROGRADES, INSERT STENT URETER(S);  CYSTOSCOPY, LEFT URETEROSCOPY, LEFT STENT PLACEMENT ;  Surgeon: Amanda Martinez MD;  Location:  OR     LASER HOLMIUM LITHOTRIPSY URETER(S), INSERT STENT, COMBINED Left 7/6/2017    Procedure: COMBINED CYSTOSCOPY, URETEROSCOPY, LASER HOLMIUM LITHOTRIPSY URETER(S), INSERT STENT;  CYSTOSCOPY, LEFT URETEROSCOPY, HOLMIUM LASER LITHOTRIPSY, LEFT STENT PLACEMENT ;  Surgeon: Amanda Martinez MD;  Location:  OR       FAMILY HISTORY: History reviewed. No pertinent family history.    SOCIAL HISTORY:   Social History   Substance Use Topics     Smoking status: Never Smoker     Smokeless tobacco: Never Used     Alcohol use No       No current outpatient prescriptions on file.     No current facility-administered medications for this visit.          PHYSICAL EXAM:    /70 (BP Location: Left arm, Patient Position: Sitting, Cuff Size: Adult Regular)  Pulse 60  Ht 1.6 m (5' 3\")  Wt 56.7 kg (125 lb)  " BMI 22.14 kg/m2    HEENT: Normocephalic and atraumatic   Cardiac: Not done  Back/Flank: Not done  CNS/PNS: Not done  Respiratory: Normal non-labored breathing  Abdomen: Soft nontender and nondistended  Peripheral Vascular: Not done  Mental Status: Not done    Penis: Not done  Scrotal Skin: Not done  Testicles: Not done  Epididymis: Not done  Digital Rectal Exam:     Cystoscopy: Not done    Imaging: CT scans from ED visit and from today reviewed with the patient     Urinalysis: UA RESULTS:  Recent Labs   Lab Test  05/30/17   1557   05/14/17   0755   COLOR  Yellow   < >  Yellow   APPEARANCE  Clear   < >  Slightly Cloudy   URINEGLC  Negative   < >  Negative   URINEBILI  Negative   < >  Negative   URINEKETONE  Negative   < >  Negative   SG  1.015   < >  1.013   UBLD  Negative   < >  Large*   URINEPH  7.0   < >  6.0   PROTEIN  Negative   < >  Negative   UROBILINOGEN  0.2   < >   --    NITRITE  Negative   < >  Negative   LEUKEST  Negative   < >  Negative   RBCU   --    --   101*   WBCU   --    --   1    < > = values in this interval not displayed.       PSA:     Post Void Residual:     Other labs: None today      IMPRESSION:  38 y/o M with distal ureteral calculus on the left s/p ureteroscopy for clearance    PLAN:  Increase dietary citrate   Decrease dietary sodium   Repeat 24 hr urine x1 in three months   Follow up in three months     Total Time: 15 minutes                                       Total in Consultation: greater than 50%

## 2019-01-06 ENCOUNTER — TRANSFERRED RECORDS (OUTPATIENT)
Dept: HEALTH INFORMATION MANAGEMENT | Facility: CLINIC | Age: 39
End: 2019-01-06

## 2019-01-22 ENCOUNTER — OFFICE VISIT (OUTPATIENT)
Dept: UROLOGY | Facility: CLINIC | Age: 39
End: 2019-01-22
Payer: COMMERCIAL

## 2019-01-22 VITALS
DIASTOLIC BLOOD PRESSURE: 70 MMHG | HEIGHT: 63 IN | BODY MASS INDEX: 22.15 KG/M2 | HEART RATE: 78 BPM | WEIGHT: 125 LBS | SYSTOLIC BLOOD PRESSURE: 120 MMHG | OXYGEN SATURATION: 98 %

## 2019-01-22 DIAGNOSIS — N20.0 CALCULUS OF KIDNEY: Primary | ICD-10-CM

## 2019-01-22 PROCEDURE — 99213 OFFICE O/P EST LOW 20 MIN: CPT | Performed by: UROLOGY

## 2019-01-22 RX ORDER — POTASSIUM CITRATE 10 MEQ/1
10 TABLET, EXTENDED RELEASE ORAL 2 TIMES DAILY WITH MEALS
Qty: 180 TABLET | Refills: 0 | Status: SHIPPED | OUTPATIENT
Start: 2019-01-22 | End: 2019-04-23

## 2019-01-22 ASSESSMENT — MIFFLIN-ST. JEOR: SCORE: 1387.13

## 2019-01-22 ASSESSMENT — PAIN SCALES - GENERAL: PAINLEVEL: NO PAIN (0)

## 2019-01-22 NOTE — NURSING NOTE
Chief Complaint   Patient presents with     RECHECK     Pt is here to review fletcher.     Evelyn Figueredo, CMA

## 2019-01-22 NOTE — PROGRESS NOTES
UROLOGIC DIAGNOSES:       CURRENT INTERVENTIONS:       HISTORY:     Patient presents for evaluation and management of left distal ureteral calculus.   Patient presented to the ED with pain on 05/14/2017. Found on CT scan to have a ~ 6mm ureteral calculus in the distal left ureter.   He notes that his pain is controlled with scheduled ibuprofen and some oxycodone, but he has not had pain over the last five days.     Patient s/p ureteroscopy for clearance (after initial stent placement for ureteral stenosis).   CT scan previously showed no fragments and no hydronephrosis     24 hr urine showed hypocitraturia, mild hypercalciuria at last visit.    Did repeat 24 hr urine that showed worsening hypocitraturia and extreme hypercalciuria.     We discussed adding potassium citrate, very low sodium diet, repeat sodium challenge, imaging on follow up, repeat 24 hr urine.             PAST MEDICAL HISTORY: History reviewed. No pertinent past medical history.    PAST SURGICAL HISTORY:   Past Surgical History:   Procedure Laterality Date     CYSTOSCOPY, RETROGRADES, INSERT STENT URETER(S), COMBINED  6/15/2017    Procedure: COMBINED CYSTOSCOPY, RETROGRADES, INSERT STENT URETER(S);  CYSTOSCOPY, LEFT URETEROSCOPY, LEFT STENT PLACEMENT ;  Surgeon: Amanda Martinez MD;  Location:  OR     LASER HOLMIUM LITHOTRIPSY URETER(S), INSERT STENT, COMBINED Left 7/6/2017    Procedure: COMBINED CYSTOSCOPY, URETEROSCOPY, LASER HOLMIUM LITHOTRIPSY URETER(S), INSERT STENT;  CYSTOSCOPY, LEFT URETEROSCOPY, HOLMIUM LASER LITHOTRIPSY, LEFT STENT PLACEMENT ;  Surgeon: Amanda Martinez MD;  Location:  OR       FAMILY HISTORY: History reviewed. No pertinent family history.    SOCIAL HISTORY:   Social History     Tobacco Use     Smoking status: Never Smoker     Smokeless tobacco: Never Used   Substance Use Topics     Alcohol use: No       Current Outpatient Medications   Medication     potassium citrate (UROCIT-K) 10 MEQ (1080 MG) CR  "tablet     No current facility-administered medications for this visit.          PHYSICAL EXAM:    /70 (BP Location: Left arm, Patient Position: Sitting, Cuff Size: Adult Regular)   Pulse 78   Ht 1.6 m (5' 3\")   Wt 56.7 kg (125 lb)   SpO2 98%   BMI 22.14 kg/m      HEENT: Normocephalic and atraumatic   Cardiac: Not done  Back/Flank: Not done  CNS/PNS: Not done  Respiratory: Normal non-labored breathing  Abdomen: Soft nontender and nondistended  Peripheral Vascular: Not done  Mental Status: Not done    Penis: Not done  Scrotal Skin: Not done  Testicles: Not done  Epididymis: Not done  Digital Rectal Exam:     Cystoscopy: Not done    Imaging: CT scans from ED visit and from today reviewed with the patient     Urinalysis: UA RESULTS:  Recent Labs   Lab Test  05/30/17   1557   05/14/17   0755   COLOR  Yellow   < >  Yellow   APPEARANCE  Clear   < >  Slightly Cloudy   URINEGLC  Negative   < >  Negative   URINEBILI  Negative   < >  Negative   URINEKETONE  Negative   < >  Negative   SG  1.015   < >  1.013   UBLD  Negative   < >  Large*   URINEPH  7.0   < >  6.0   PROTEIN  Negative   < >  Negative   UROBILINOGEN  0.2   < >   --    NITRITE  Negative   < >  Negative   LEUKEST  Negative   < >  Negative   RBCU   --    --   101*   WBCU   --    --   1    < > = values in this interval not displayed.       PSA:     Post Void Residual:     Other labs: None today      IMPRESSION:  39 y/o M with distal ureteral calculus on the left s/p ureteroscopy for clearance    PLAN:  Start trial of potassium citrate   Sodium challenge   Repat 24 hr urine in 2-3 months   CT scan prior to next visit   Follow up in three months         Total Time: 15 minutes                                       Total in Consultation: greater than 50%     "

## 2019-01-22 NOTE — LETTER
1/22/2019       RE: Faye Pugh  [id number]  [facility] [address]     Dear Colleague,    Thank you for referring your patient, Faye Pugh, to the [facility] UROLOGY CLINIC [place] at [facility]. Please see a copy of my visit note below.    UROLOGIC DIAGNOSES:       CURRENT INTERVENTIONS:       HISTORY:     Patient presents for evaluation and management of left distal ureteral calculus.   Patient presented to the ED with pain on 05/14/2017. Found on CT scan to have a ~ 6mm ureteral calculus in the distal left ureter.   He notes that his pain is controlled with scheduled ibuprofen and some oxycodone, but he has not had pain over the last five days.     Patient s/p ureteroscopy for clearance (after initial stent placement for ureteral stenosis).   CT scan previously showed no fragments and no hydronephrosis     24 hr urine showed hypocitraturia, mild hypercalciuria at last visit.    Did repeat 24 hr urine that showed worsening hypocitraturia and extreme hypercalciuria.     We discussed adding potassium citrate, very low sodium diet, repeat sodium challenge, imaging on follow up, repeat 24 hr urine.             PAST MEDICAL HISTORY: History reviewed. No pertinent past medical history.    PAST SURGICAL HISTORY:   Past Surgical History:   Procedure Laterality Date     CYSTOSCOPY, RETROGRADES, INSERT STENT URETER(S), COMBINED  6/15/2017    Procedure: COMBINED CYSTOSCOPY, RETROGRADES, INSERT STENT URETER(S);  CYSTOSCOPY, LEFT URETEROSCOPY, LEFT STENT PLACEMENT ;  Surgeon: Amanda Martinez MD;  Location:  OR     LASER HOLMIUM LITHOTRIPSY URETER(S), INSERT STENT, COMBINED Left 7/6/2017    Procedure: COMBINED CYSTOSCOPY, URETEROSCOPY, LASER HOLMIUM LITHOTRIPSY URETER(S), INSERT STENT;  CYSTOSCOPY, LEFT URETEROSCOPY, HOLMIUM LASER LITHOTRIPSY, LEFT STENT PLACEMENT ;  Surgeon: Amanda Martinez MD;  Location:  OR       FAMILY HISTORY: History  "reviewed. No pertinent family history.    SOCIAL HISTORY:   Social History     Tobacco Use     Smoking status: Never Smoker     Smokeless tobacco: Never Used   Substance Use Topics     Alcohol use: No       Current Outpatient Medications   Medication     potassium citrate (UROCIT-K) 10 MEQ (1080 MG) CR tablet     No current facility-administered medications for this visit.          PHYSICAL EXAM:    /70 (BP Location: Left arm, Patient Position: Sitting, Cuff Size: Adult Regular)   Pulse 78   Ht 1.6 m (5' 3\")   Wt 56.7 kg (125 lb)   SpO2 98%   BMI 22.14 kg/m       HEENT: Normocephalic and atraumatic   Cardiac: Not done  Back/Flank: Not done  CNS/PNS: Not done  Respiratory: Normal non-labored breathing  Abdomen: Soft nontender and nondistended  Peripheral Vascular: Not done  Mental Status: Not done    Penis: Not done  Scrotal Skin: Not done  Testicles: Not done  Epididymis: Not done  Digital Rectal Exam:     Cystoscopy: Not done    Imaging: CT scans from ED visit and from today reviewed with the patient     Urinalysis: UA RESULTS:  Recent Labs   Lab Test  05/30/17   1557   05/14/17   0755   COLOR  Yellow   < >  Yellow   APPEARANCE  Clear   < >  Slightly Cloudy   URINEGLC  Negative   < >  Negative   URINEBILI  Negative   < >  Negative   URINEKETONE  Negative   < >  Negative   SG  1.015   < >  1.013   UBLD  Negative   < >  Large*   URINEPH  7.0   < >  6.0   PROTEIN  Negative   < >  Negative   UROBILINOGEN  0.2   < >   --    NITRITE  Negative   < >  Negative   LEUKEST  Negative   < >  Negative   RBCU   --    --   101*   WBCU   --    --   1    < > = values in this interval not displayed.       PSA:     Post Void Residual:     Other labs: None today      IMPRESSION:  39 y/o M with distal ureteral calculus on the left s/p ureteroscopy for clearance    PLAN:  Start trial of potassium citrate   Sodium challenge   Repat 24 hr urine in 2-3 months   CT scan prior to next visit   Follow up in three months         Total " Time: 15 minutes                                       Total in Consultation: greater than 50%       Again, thank you for allowing me to participate in the care of your patient.      Sincerely,    Amanda Martinez MD

## 2019-04-08 ENCOUNTER — TRANSFERRED RECORDS (OUTPATIENT)
Dept: HEALTH INFORMATION MANAGEMENT | Facility: CLINIC | Age: 39
End: 2019-04-08

## 2019-04-23 ENCOUNTER — HOSPITAL ENCOUNTER (OUTPATIENT)
Dept: CT IMAGING | Facility: CLINIC | Age: 39
Discharge: HOME | End: 2019-04-23
Attending: UROLOGY | Admitting: UROLOGY
Payer: COMMERCIAL

## 2019-04-23 ENCOUNTER — OFFICE VISIT (OUTPATIENT)
Dept: UROLOGY | Facility: CLINIC | Age: 39
End: 2019-04-23
Payer: COMMERCIAL

## 2019-04-23 VITALS
HEIGHT: 63 IN | BODY MASS INDEX: 22.15 KG/M2 | WEIGHT: 125 LBS | DIASTOLIC BLOOD PRESSURE: 70 MMHG | HEART RATE: 73 BPM | SYSTOLIC BLOOD PRESSURE: 120 MMHG | OXYGEN SATURATION: 98 %

## 2019-04-23 DIAGNOSIS — R35.0 URINARY FREQUENCY: Primary | ICD-10-CM

## 2019-04-23 DIAGNOSIS — N20.0 CALCULUS OF KIDNEY: Primary | ICD-10-CM

## 2019-04-23 DIAGNOSIS — N20.0 CALCULUS OF KIDNEY: ICD-10-CM

## 2019-04-23 LAB
ALBUMIN UR-MCNC: NEGATIVE MG/DL
APPEARANCE UR: CLEAR
BILIRUB UR QL STRIP: NEGATIVE
COLOR UR AUTO: YELLOW
GLUCOSE UR STRIP-MCNC: NEGATIVE MG/DL
HGB UR QL STRIP: NEGATIVE
KETONES UR STRIP-MCNC: NEGATIVE MG/DL
LEUKOCYTE ESTERASE UR QL STRIP: NEGATIVE
NITRATE UR QL: NEGATIVE
PH UR STRIP: 7 PH (ref 5–7)
SOURCE: NORMAL
SP GR UR STRIP: 1.01 (ref 1–1.03)
UROBILINOGEN UR STRIP-ACNC: 0.2 EU/DL (ref 0.2–1)

## 2019-04-23 PROCEDURE — 74176 CT ABD & PELVIS W/O CONTRAST: CPT

## 2019-04-23 PROCEDURE — 81003 URINALYSIS AUTO W/O SCOPE: CPT | Performed by: UROLOGY

## 2019-04-23 PROCEDURE — 99213 OFFICE O/P EST LOW 20 MIN: CPT | Performed by: UROLOGY

## 2019-04-23 RX ORDER — POTASSIUM CITRATE 10 MEQ/1
10 TABLET, EXTENDED RELEASE ORAL
Qty: 180 TABLET | Refills: 3 | Status: SHIPPED | OUTPATIENT
Start: 2019-04-23 | End: 2019-12-17

## 2019-04-23 RX ORDER — ALFUZOSIN HYDROCHLORIDE 10 MG/1
10 TABLET, EXTENDED RELEASE ORAL DAILY
Qty: 30 TABLET | Refills: 11 | Status: SHIPPED | OUTPATIENT
Start: 2019-04-23 | End: 2020-05-20

## 2019-04-23 RX ORDER — ALFUZOSIN HYDROCHLORIDE 10 MG/1
10 TABLET, EXTENDED RELEASE ORAL DAILY
Qty: 30 TABLET | Refills: 3 | Status: SHIPPED | OUTPATIENT
Start: 2019-04-23 | End: 2019-04-23

## 2019-04-23 ASSESSMENT — PAIN SCALES - GENERAL: PAINLEVEL: NO PAIN (0)

## 2019-04-23 ASSESSMENT — MIFFLIN-ST. JEOR: SCORE: 1382.13

## 2019-04-23 NOTE — LETTER
RE: Faye Pugh  [id number] [facility] [place] [facility] [address]     Dear Colleague,    Thank you for referring your patient, Faye Pugh, to the [facility] UROLOGY CLINIC [place] at [facility]. Please see a copy of my visit note below.    UROLOGIC DIAGNOSES:     CURRENT INTERVENTIONS:       HISTORY:     Patient presents for evaluation and management of left distal ureteral calculus.   Patient presented to the ED with pain on 05/14/2017. Found on CT scan to have a ~ 6mm ureteral calculus in the distal left ureter.   He notes that his pain is controlled with scheduled ibuprofen and some oxycodone, but he has not had pain over the last five days.     Patient s/p ureteroscopy for clearance (after initial stent placement for ureteral stenosis).   CT scan previously showed no fragments and no hydronephrosis     24 hr urine showed hypocitraturia, mild hypercalciuria at last visit.    Did repeat 24 hr urine that showed worsening hypocitraturia and extreme hypercalciuria.     CT scan today was negative.   Had repeat 24 hr urine that showed borderline hypercalciuria, borderline hyperoxaluria, improved but still low citrate.     We discussed these findings and how to improve the latter two.   Discussed CT scan.               PAST MEDICAL HISTORY: History reviewed. No pertinent past medical history.    PAST SURGICAL HISTORY:   Past Surgical History:   Procedure Laterality Date     CYSTOSCOPY, RETROGRADES, INSERT STENT URETER(S), COMBINED  6/15/2017    Procedure: COMBINED CYSTOSCOPY, RETROGRADES, INSERT STENT URETER(S);  CYSTOSCOPY, LEFT URETEROSCOPY, LEFT STENT PLACEMENT ;  Surgeon: Amanda Martinez MD;  Location: [facility] OR     LASER HOLMIUM LITHOTRIPSY URETER(S), INSERT STENT, COMBINED Left 7/6/2017    Procedure: COMBINED CYSTOSCOPY, URETEROSCOPY, LASER HOLMIUM LITHOTRIPSY URETER(S), INSERT STENT;  CYSTOSCOPY, LEFT URETEROSCOPY, HOLMIUM LASER LITHOTRIPSY, LEFT STENT  "PLACEMENT ;  Surgeon: Amanda Martinez MD;  Location:  OR     FAMILY HISTORY: History reviewed. No pertinent family history.    SOCIAL HISTORY:   Social History     Tobacco Use     Smoking status: Never Smoker     Smokeless tobacco: Never Used   Substance Use Topics     Alcohol use: No       Current Outpatient Medications   Medication     alfuzosin ER (UROXATRAL) 10 MG 24 hr tablet     potassium citrate (UROCIT-K) 10 MEQ (1080 MG) CR tablet     No current facility-administered medications for this visit.      PHYSICAL EXAM:    /70 (BP Location: Left arm, Patient Position: Sitting, Cuff Size: Adult Regular)   Pulse 73   Ht 1.6 m (5' 3\")   Wt 56.7 kg (125 lb)   SpO2 98%   BMI 22.14 kg/m       HEENT: Normocephalic and atraumatic   Cardiac: Not done  Back/Flank: Not done  CNS/PNS: Not done  Respiratory: Normal non-labored breathing  Abdomen: Soft nontender and nondistended  Peripheral Vascular: Not done  Mental Status: Not done    Penis: Not done  Scrotal Skin: Not done  Testicles: Not done  Epididymis: Not done  Digital Rectal Exam:     Cystoscopy: Not done    Imaging: CT scans from ED visit and from today reviewed with the patient     Urinalysis: UA RESULTS:  Recent Labs   Lab Test  05/30/17   1557   05/14/17   0755   COLOR  Yellow   < >  Yellow   APPEARANCE  Clear   < >  Slightly Cloudy   URINEGLC  Negative   < >  Negative   URINEBILI  Negative   < >  Negative   URINEKETONE  Negative   < >  Negative   SG  1.015   < >  1.013   UBLD  Negative   < >  Large*   URINEPH  7.0   < >  6.0   PROTEIN  Negative   < >  Negative   UROBILINOGEN  0.2   < >   --    NITRITE  Negative   < >  Negative   LEUKEST  Negative   < >  Negative   RBCU   --    --   101*   WBCU   --    --   1    < > = values in this interval not displayed.     PSA:     Post Void Residual:     Other labs: None today    IMPRESSION:  38 y/o M with distal ureteral calculus on the left s/p ureteroscopy for clearance    PLAN:  Continue low sodium " diet   Increase potassium citrate to TID   Lower oxalate diet   Repeat 24 hr urine in six months   Follow up in six months     Total Time: 15 minutes                                       Total in Consultation: greater than 50%     Again, thank you for allowing me to participate in the care of your patient.      Sincerely,    Amanda Martinez MD

## 2019-04-23 NOTE — PATIENT INSTRUCTIONS
Look at the [facility] Oxalate Diet website   Continue low sodium diet   Decrease animal protein   Decrease oxalates

## 2019-04-23 NOTE — NURSING NOTE
"Chief Complaint   Patient presents with     Calaulus Kidney     Patient to go over CT result       Blood pressure 120/70, pulse 73, height 1.6 m (5' 3\"), weight 56.7 kg (125 lb), SpO2 98 %. Body mass index is 22.14 kg/m .    There is no problem list on file for this patient.      No Known Allergies    Current Outpatient Medications   Medication Sig Dispense Refill     alfuzosin ER (UROXATRAL) 10 MG 24 hr tablet Take 1 tablet (10 mg) by mouth daily 30 tablet 11     potassium citrate (UROCIT-K) 10 MEQ (1080 MG) CR tablet Take 1 tablet (10 mEq) by mouth 3 times daily (with meals) 180 tablet 3       Social History     Tobacco Use     Smoking status: Never Smoker     Smokeless tobacco: Never Used   Substance Use Topics     Alcohol use: No     Drug use: No       Sloane Michael MA  4/23/2019         "

## 2019-04-25 NOTE — PROGRESS NOTES
UROLOGIC DIAGNOSES:       CURRENT INTERVENTIONS:       HISTORY:     Patient presents for evaluation and management of left distal ureteral calculus.   Patient presented to the ED with pain on 05/14/2017. Found on CT scan to have a ~ 6mm ureteral calculus in the distal left ureter.   He notes that his pain is controlled with scheduled ibuprofen and some oxycodone, but he has not had pain over the last five days.     Patient s/p ureteroscopy for clearance (after initial stent placement for ureteral stenosis).   CT scan previously showed no fragments and no hydronephrosis     24 hr urine showed hypocitraturia, mild hypercalciuria at last visit.    Did repeat 24 hr urine that showed worsening hypocitraturia and extreme hypercalciuria.     CT scan today was negative.   Had repeat 24 hr urine that showed borderline hypercalciuria, borderline hyperoxaluria, improved but still low citrate.     We discussed these findings and how to improve the latter two.   Discussed CT scan.               PAST MEDICAL HISTORY: History reviewed. No pertinent past medical history.    PAST SURGICAL HISTORY:   Past Surgical History:   Procedure Laterality Date     CYSTOSCOPY, RETROGRADES, INSERT STENT URETER(S), COMBINED  6/15/2017    Procedure: COMBINED CYSTOSCOPY, RETROGRADES, INSERT STENT URETER(S);  CYSTOSCOPY, LEFT URETEROSCOPY, LEFT STENT PLACEMENT ;  Surgeon: Amanda Martinez MD;  Location:  OR     LASER HOLMIUM LITHOTRIPSY URETER(S), INSERT STENT, COMBINED Left 7/6/2017    Procedure: COMBINED CYSTOSCOPY, URETEROSCOPY, LASER HOLMIUM LITHOTRIPSY URETER(S), INSERT STENT;  CYSTOSCOPY, LEFT URETEROSCOPY, HOLMIUM LASER LITHOTRIPSY, LEFT STENT PLACEMENT ;  Surgeon: Amanda Martinez MD;  Location:  OR       FAMILY HISTORY: History reviewed. No pertinent family history.    SOCIAL HISTORY:   Social History     Tobacco Use     Smoking status: Never Smoker     Smokeless tobacco: Never Used   Substance Use Topics     Alcohol  "use: No       Current Outpatient Medications   Medication     alfuzosin ER (UROXATRAL) 10 MG 24 hr tablet     potassium citrate (UROCIT-K) 10 MEQ (1080 MG) CR tablet     No current facility-administered medications for this visit.          PHYSICAL EXAM:    /70 (BP Location: Left arm, Patient Position: Sitting, Cuff Size: Adult Regular)   Pulse 73   Ht 1.6 m (5' 3\")   Wt 56.7 kg (125 lb)   SpO2 98%   BMI 22.14 kg/m      HEENT: Normocephalic and atraumatic   Cardiac: Not done  Back/Flank: Not done  CNS/PNS: Not done  Respiratory: Normal non-labored breathing  Abdomen: Soft nontender and nondistended  Peripheral Vascular: Not done  Mental Status: Not done    Penis: Not done  Scrotal Skin: Not done  Testicles: Not done  Epididymis: Not done  Digital Rectal Exam:     Cystoscopy: Not done    Imaging: CT scans from ED visit and from today reviewed with the patient     Urinalysis: UA RESULTS:  Recent Labs   Lab Test  05/30/17   1557   05/14/17   0755   COLOR  Yellow   < >  Yellow   APPEARANCE  Clear   < >  Slightly Cloudy   URINEGLC  Negative   < >  Negative   URINEBILI  Negative   < >  Negative   URINEKETONE  Negative   < >  Negative   SG  1.015   < >  1.013   UBLD  Negative   < >  Large*   URINEPH  7.0   < >  6.0   PROTEIN  Negative   < >  Negative   UROBILINOGEN  0.2   < >   --    NITRITE  Negative   < >  Negative   LEUKEST  Negative   < >  Negative   RBCU   --    --   101*   WBCU   --    --   1    < > = values in this interval not displayed.       PSA:     Post Void Residual:     Other labs: None today      IMPRESSION:  38 y/o M with distal ureteral calculus on the left s/p ureteroscopy for clearance    PLAN:  Continue low sodium diet   Increase potassium citrate to TID   Lower oxalate diet   Repeat 24 hr urine in six months   Follow up in six months         Total Time: 15 minutes                                       Total in Consultation: greater than 50%     "

## 2019-09-30 ENCOUNTER — TRANSFERRED RECORDS (OUTPATIENT)
Dept: HEALTH INFORMATION MANAGEMENT | Facility: CLINIC | Age: 39
End: 2019-09-30

## 2019-12-17 ENCOUNTER — OFFICE VISIT (OUTPATIENT)
Dept: UROLOGY | Facility: CLINIC | Age: 39
End: 2019-12-17
Payer: COMMERCIAL

## 2019-12-17 VITALS
WEIGHT: 125 LBS | DIASTOLIC BLOOD PRESSURE: 66 MMHG | HEART RATE: 60 BPM | SYSTOLIC BLOOD PRESSURE: 124 MMHG | BODY MASS INDEX: 22.15 KG/M2 | HEIGHT: 63 IN

## 2019-12-17 DIAGNOSIS — N20.0 CALCULUS OF KIDNEY: ICD-10-CM

## 2019-12-17 DIAGNOSIS — R35.0 URINARY FREQUENCY: Primary | ICD-10-CM

## 2019-12-17 LAB
ALBUMIN UR-MCNC: NEGATIVE MG/DL
APPEARANCE UR: CLEAR
BILIRUB UR QL STRIP: NEGATIVE
COLOR UR AUTO: YELLOW
GLUCOSE UR STRIP-MCNC: NEGATIVE MG/DL
HGB UR QL STRIP: NEGATIVE
KETONES UR STRIP-MCNC: NEGATIVE MG/DL
LEUKOCYTE ESTERASE UR QL STRIP: NEGATIVE
NITRATE UR QL: NEGATIVE
PH UR STRIP: 6 PH (ref 5–7)
SOURCE: NORMAL
SP GR UR STRIP: 1.01 (ref 1–1.03)
UROBILINOGEN UR STRIP-ACNC: 0.2 EU/DL (ref 0.2–1)

## 2019-12-17 PROCEDURE — 81003 URINALYSIS AUTO W/O SCOPE: CPT | Performed by: UROLOGY

## 2019-12-17 PROCEDURE — 99213 OFFICE O/P EST LOW 20 MIN: CPT | Performed by: UROLOGY

## 2019-12-17 RX ORDER — POTASSIUM CITRATE 10 MEQ/1
10 TABLET, EXTENDED RELEASE ORAL
Qty: 180 TABLET | Refills: 3 | Status: SHIPPED | OUTPATIENT
Start: 2019-12-17 | End: 2020-08-05

## 2019-12-17 ASSESSMENT — PAIN SCALES - GENERAL: PAINLEVEL: NO PAIN (0)

## 2019-12-17 ASSESSMENT — MIFFLIN-ST. JEOR: SCORE: 1382.13

## 2019-12-17 NOTE — NURSING NOTE
Chief Complaint   Patient presents with     Clinic Care Coordination - Follow-up     History of Kidney stone      mAy Durbin LPN

## 2019-12-17 NOTE — LETTER
12/17/2019       RE: Faye Pugh  [id number]  [facility] [address]     Dear Colleague,    Thank you for referring your patient, Faye Pugh, to the [facility] UROLOGY CLINIC [place] at [facility]. Please see a copy of my visit note below.    UROLOGIC DIAGNOSES:       CURRENT INTERVENTIONS:       HISTORY:     Patient presents for evaluation and management of left distal ureteral calculus.   Patient presented to the ED with pain on 05/14/2017. Found on CT scan to have a ~ 6mm ureteral calculus in the distal left ureter.   He notes that his pain is controlled with scheduled ibuprofen and some oxycodone, but he has not had pain over the last five days.     Patient s/p ureteroscopy for clearance (after initial stent placement for ureteral stenosis).   CT scan previously showed no fragments and no hydronephrosis     24 hr urine showed hypocitraturia, mild hypercalciuria at last visit.    Did repeat 24 hr urine that showed worsening hypocitraturia and extreme hypercalciuria.     CT scan today was negative.   Had repeat 24 hr urine that showed borderline hypercalciuria, borderline hyperoxaluria, improved but still low citrate.     Repeat 24 hr urine collection revealed hypocitraturia and low urine pH with other findings above improved.     Patient notes some newer onset lower urinary tract symptoms.         We discussed these findings and use of potassium citrate.                 PAST MEDICAL HISTORY: No past medical history on file.    PAST SURGICAL HISTORY:   Past Surgical History:   Procedure Laterality Date     CYSTOSCOPY, RETROGRADES, INSERT STENT URETER(S), COMBINED  6/15/2017    Procedure: COMBINED CYSTOSCOPY, RETROGRADES, INSERT STENT URETER(S);  CYSTOSCOPY, LEFT URETEROSCOPY, LEFT STENT PLACEMENT ;  Surgeon: Amanda Martinez MD;  Location: [facility] OR     LASER HOLMIUM LITHOTRIPSY URETER(S), INSERT STENT, COMBINED Left 7/6/2017    Procedure: COMBINED  "CYSTOSCOPY, URETEROSCOPY, LASER HOLMIUM LITHOTRIPSY URETER(S), INSERT STENT;  CYSTOSCOPY, LEFT URETEROSCOPY, HOLMIUM LASER LITHOTRIPSY, LEFT STENT PLACEMENT ;  Surgeon: Amanda Martinez MD;  Location:  OR       FAMILY HISTORY: No family history on file.    SOCIAL HISTORY:   Social History     Tobacco Use     Smoking status: Never Smoker     Smokeless tobacco: Never Used   Substance Use Topics     Alcohol use: No       Current Outpatient Medications   Medication     alfuzosin ER (UROXATRAL) 10 MG 24 hr tablet     potassium citrate (UROCIT-K) 10 MEQ (1080 MG) CR tablet     No current facility-administered medications for this visit.          PHYSICAL EXAM:    /66 (BP Location: Right arm)   Pulse 60   Ht 1.6 m (5' 3\")   Wt 56.7 kg (125 lb)   BMI 22.14 kg/m       HEENT: Normocephalic and atraumatic   Cardiac: Not done  Back/Flank: Not done  CNS/PNS: Not done  Respiratory: Normal non-labored breathing  Abdomen: Soft nontender and nondistended  Peripheral Vascular: Not done  Mental Status: Not done    Penis: Not done  Scrotal Skin: Not done  Testicles: Not done  Epididymis: Not done  Digital Rectal Exam:     Cystoscopy: Not done    Imaging: CT scans from ED visit and from today reviewed with the patient     Urinalysis: UA RESULTS:  Recent Labs   Lab Test  05/30/17   1557   05/14/17   0755   COLOR  Yellow   < >  Yellow   APPEARANCE  Clear   < >  Slightly Cloudy   URINEGLC  Negative   < >  Negative   URINEBILI  Negative   < >  Negative   URINEKETONE  Negative   < >  Negative   SG  1.015   < >  1.013   UBLD  Negative   < >  Large*   URINEPH  7.0   < >  6.0   PROTEIN  Negative   < >  Negative   UROBILINOGEN  0.2   < >   --    NITRITE  Negative   < >  Negative   LEUKEST  Negative   < >  Negative   RBCU   --    --   101*   WBCU   --    --   1    < > = values in this interval not displayed.       PSA:     Post Void Residual:     Other labs: None today      IMPRESSION:  40 y/o M with distal ureteral calculus " on the left s/p ureteroscopy for clearance now with some lower urinary tract symptoms including frequency and slowed stream     PLAN:  Start trial of potassium citrate   UDS at [facility]   Follow up after UDS   Will recheck lithotlink in sevearl months         Total Time: 15 minutes                                       Total in Consultation: greater than 50%       Again, thank you for allowing me to participate in the care of your patient.      Sincerely,    Amanda Martinez MD

## 2019-12-17 NOTE — PROGRESS NOTES
UROLOGIC DIAGNOSES:       CURRENT INTERVENTIONS:       HISTORY:     Patient presents for evaluation and management of left distal ureteral calculus.   Patient presented to the ED with pain on 05/14/2017. Found on CT scan to have a ~ 6mm ureteral calculus in the distal left ureter.   He notes that his pain is controlled with scheduled ibuprofen and some oxycodone, but he has not had pain over the last five days.     Patient s/p ureteroscopy for clearance (after initial stent placement for ureteral stenosis).   CT scan previously showed no fragments and no hydronephrosis     24 hr urine showed hypocitraturia, mild hypercalciuria at last visit.    Did repeat 24 hr urine that showed worsening hypocitraturia and extreme hypercalciuria.     CT scan today was negative.   Had repeat 24 hr urine that showed borderline hypercalciuria, borderline hyperoxaluria, improved but still low citrate.     Repeat 24 hr urine collection revealed hypocitraturia and low urine pH with other findings above improved.     Patient notes some newer onset lower urinary tract symptoms.         We discussed these findings and use of potassium citrate.                 PAST MEDICAL HISTORY: No past medical history on file.    PAST SURGICAL HISTORY:   Past Surgical History:   Procedure Laterality Date     CYSTOSCOPY, RETROGRADES, INSERT STENT URETER(S), COMBINED  6/15/2017    Procedure: COMBINED CYSTOSCOPY, RETROGRADES, INSERT STENT URETER(S);  CYSTOSCOPY, LEFT URETEROSCOPY, LEFT STENT PLACEMENT ;  Surgeon: Amanda Martinez MD;  Location:  OR     LASER HOLMIUM LITHOTRIPSY URETER(S), INSERT STENT, COMBINED Left 7/6/2017    Procedure: COMBINED CYSTOSCOPY, URETEROSCOPY, LASER HOLMIUM LITHOTRIPSY URETER(S), INSERT STENT;  CYSTOSCOPY, LEFT URETEROSCOPY, HOLMIUM LASER LITHOTRIPSY, LEFT STENT PLACEMENT ;  Surgeon: Amanda Martinez MD;  Location:  OR       FAMILY HISTORY: No family history on file.    SOCIAL HISTORY:   Social History  "    Tobacco Use     Smoking status: Never Smoker     Smokeless tobacco: Never Used   Substance Use Topics     Alcohol use: No       Current Outpatient Medications   Medication     alfuzosin ER (UROXATRAL) 10 MG 24 hr tablet     potassium citrate (UROCIT-K) 10 MEQ (1080 MG) CR tablet     No current facility-administered medications for this visit.          PHYSICAL EXAM:    /66 (BP Location: Right arm)   Pulse 60   Ht 1.6 m (5' 3\")   Wt 56.7 kg (125 lb)   BMI 22.14 kg/m      HEENT: Normocephalic and atraumatic   Cardiac: Not done  Back/Flank: Not done  CNS/PNS: Not done  Respiratory: Normal non-labored breathing  Abdomen: Soft nontender and nondistended  Peripheral Vascular: Not done  Mental Status: Not done    Penis: Not done  Scrotal Skin: Not done  Testicles: Not done  Epididymis: Not done  Digital Rectal Exam:     Cystoscopy: Not done    Imaging: CT scans from ED visit and from today reviewed with the patient     Urinalysis: UA RESULTS:  Recent Labs   Lab Test  05/30/17   1557   05/14/17   0755   COLOR  Yellow   < >  Yellow   APPEARANCE  Clear   < >  Slightly Cloudy   URINEGLC  Negative   < >  Negative   URINEBILI  Negative   < >  Negative   URINEKETONE  Negative   < >  Negative   SG  1.015   < >  1.013   UBLD  Negative   < >  Large*   URINEPH  7.0   < >  6.0   PROTEIN  Negative   < >  Negative   UROBILINOGEN  0.2   < >   --    NITRITE  Negative   < >  Negative   LEUKEST  Negative   < >  Negative   RBCU   --    --   101*   WBCU   --    --   1    < > = values in this interval not displayed.       PSA:     Post Void Residual:     Other labs: None today      IMPRESSION:  40 y/o M with distal ureteral calculus on the left s/p ureteroscopy for clearance now with some lower urinary tract symptoms including frequency and slowed stream     PLAN:  Start trial of potassium citrate   UDS at [facility]   Follow up after UDS   Will recheck lithotlink in sevearl months         Total Time: 15 minutes                         "               Total in Consultation: greater than 50%

## 2020-01-09 ENCOUNTER — PRE VISIT (OUTPATIENT)
Dept: UROLOGY | Facility: CLINIC | Age: 40
End: 2020-01-09

## 2020-01-15 ENCOUNTER — OFFICE VISIT (OUTPATIENT)
Dept: UROLOGY | Facility: CLINIC | Age: 40
End: 2020-01-15
Payer: COMMERCIAL

## 2020-01-15 ENCOUNTER — ANCILLARY PROCEDURE (OUTPATIENT)
Dept: RADIOLOGY | Facility: AMBULATORY SURGERY CENTER | Age: 40
End: 2020-01-15
Attending: PHYSICIAN ASSISTANT
Payer: COMMERCIAL

## 2020-01-15 VITALS
BODY MASS INDEX: 23.04 KG/M2 | HEART RATE: 87 BPM | WEIGHT: 130 LBS | DIASTOLIC BLOOD PRESSURE: 97 MMHG | SYSTOLIC BLOOD PRESSURE: 151 MMHG | HEIGHT: 63 IN

## 2020-01-15 DIAGNOSIS — R39.9 LOWER URINARY TRACT SYMPTOMS (LUTS): Primary | ICD-10-CM

## 2020-01-15 DIAGNOSIS — R35.0 URINARY FREQUENCY: ICD-10-CM

## 2020-01-15 LAB
ALBUMIN UR-MCNC: NEGATIVE MG/DL
APPEARANCE UR: CLEAR
APPEARANCE UR: CLEAR
BILIRUB UR QL STRIP: NEGATIVE
BILIRUB UR QL: NORMAL
COLOR UR AUTO: YELLOW
COLOR UR: YELLOW
GLUCOSE UR STRIP-MCNC: NEGATIVE MG/DL
GLUCOSE URINE: NORMAL MG/DL
HGB UR QL STRIP: NEGATIVE
HGB UR QL: NORMAL
KETONES UR QL: NORMAL MG/DL
KETONES UR STRIP-MCNC: NEGATIVE MG/DL
LEUKOCYTE ESTERASE UR QL STRIP: NEGATIVE
LEUKOCYTE ESTERASE URINE: NORMAL
NITRATE UR QL: NEGATIVE
NITRITE UR QL STRIP: NORMAL
PH UR STRIP: 7.5 PH (ref 5–7)
PH UR STRIP: 7.5 PH (ref 5–7)
PROTEIN ALBUMIN URINE: NORMAL MG/DL
SOURCE: NORMAL
SP GR UR STRIP: 1.01 (ref 1–1.03)
SP GR UR STRIP: 1.01 (ref 1–1.03)
UROBILINOGEN UR QL STRIP: 0.2 EU/DL (ref 0.2–1)
UROBILINOGEN UR STRIP-ACNC: 0.2 EU/DL (ref 0.2–1)

## 2020-01-15 ASSESSMENT — MIFFLIN-ST. JEOR: SCORE: 1404.81

## 2020-01-15 ASSESSMENT — PAIN SCALES - GENERAL: PAINLEVEL: NO PAIN (0)

## 2020-01-15 NOTE — LETTER
1/15/2020       RE: Faye Pugh  [id number]  [facility] [address]     Dear Colleague,    Thank you for referring your patient, Faye Pugh, to the [facility] UROLOGY AND INST FOR PROSTATE AND UROLOGIC CANCERS at [facility]. Please see a copy of my visit note below.    PREPROCEDURE DIAGNOSES:    1. Urinary frequency    POSTPROCEDURE DIAGNOSES:  -Normal bladder capacity (370 mL) with normal filling sensations.  -Normal bladder compliance without DO/DOI or WESLY.  -Good detrusor contraction during voiding to max Pdet 31 cm H2O.  -Qmax 15 mL/s with a continuous, bell-shape flow curve and complete bladder emptying (final PVR 0 mL).  -BOOI is -10.1 which is not suggestive for bladder outlet obstruction.  -EMG remains quiet throughout filling and voiding.  -Fluoroscopy reveals a smooth-walled bladder without diverticuli or VUR. Bladder neck was closed during filling. Unable to obtain voiding images since patient required standing to void.    PROCEDURE:    1. Sterile urethral catheterization for measurement of residual urine volume.  2. Complex filling cystometrogram with measurement of bladder and rectal pressures.  3. Complex voiding cystometrogram with measurement of bladder and rectal pressures.  4. Electromyography of the pelvic floor during urodynamics.  5. Fluoroscopic imaging of the bladder during urodynamics, at least 3 views.    6. Interpretation of urodynamics and flouroscopic imaging.      INDICATIONS FOR PROCEDURE:  Mr. Faye Pugh is a pleasant 39 year old male with a history of nephrolithiasis and LUTS characterized by urinary frequency. Baseline video urodynamic assessment is requested today by Dr. Martinez to better characterize Mr. Faye Pugh's voiding dysfunction.      VOIDING DIARY:  Not performed.     DESCRIPTION OF PROCEDURE:  Risks, benefits, and alternatives to urodynamics were discussed with the patient and he wished to proceed.  Urodynamics are planned to  better assess the primary etiology for Mr. Pugh's urologic dysfunction.  The patient does not currently take anticholinergic medications for his bladder - he does take alfuzosin but does not feel that it is helping.  After informed consent was obtained, the patient was taken to the procedure room where uroflowmetry was performed. Findings below.     PRE-STUDY UROFLOWMETRY:  Not performed as patient had no urge to void.  Postvoid residual by catheter: 60 mL.  Pretest urine dipstick was negative for leukocytes and nitrites.    Next a 7F double-lumen urodynamics catheter was inserted into the bladder under sterile technique via urethra.  A 7F abdominal manometry catheter was placed in the rectum.  EMG pads were placed on both sides of the anal verge.  The bladder was filled with 200 mL of Iohexol at 50 mL/minute and serial pressures were recorded.  With coughing there was an appropriate rise in vesical and abdominal pressures with no change in detrusor pressure, confirming good study catheter placement.    DURING THE FILLING PHASE:  First sensation: 185 mL.  First Desire: 227 mL.  Strong Desire: 247 mL.  Maximum Capacity: filled to 289 mL; true snf 370 mL based on final voided volume.     Uninhibited detrusor contractions: none.  Compliance: normal. PDet essentially 0 cm H2O throughout filling.  Continence: no DOI or WESLY.  EMG: concordant during filling.    DURING THE VOIDING PHASE:  Maximum detrusor contraction with void: 31 cm of H2O pressure.  Voided volume: 370 mL.  Maximum flow rate: 15 mL/sec.  Average flow rate: 8.5 mL/sec.  Postvoid Residual: 0 mL.  EMG activity: quiet.  Character of voiding curve: continuous, bell-shape.  BOOI: -10.1 (suggesting no obstruction - see key below)  [obstructed (AIKEN index [BOOI] ? 40); equivocal (no definite   obstruction; BOOI 20-40); and no obstruction (BOOI ? 20)]    FLUOROSCOPIC IMAGING OF THE BLADDER DURING URODYNAMICS:  Please note, image numbers on UDS tracings correlate  with iSite series numbers on PACS images. Fluoroscopy during today's procedure demonstrated a smooth bladder wall without diverticulae or cellules.  No vesicoureteral reflux was observed.  The bladder neck was closed during filling. Unable to obtain voiding images since patient required standing to void.  After voiding to completion, all catheters were removed and the patient was brought back into the consultation room to further discuss today's study results.      ASSESSMENT/PLAN:  Mr. Faye Pugh is a pleasant 39 year old male with LUTS who demonstrated the following findings today on urodynamic evaluation:    -Normal bladder capacity (370 mL) with normal filling sensations.  -Normal bladder compliance without DO/DOI or WESLY.  -Good detrusor contraction during voiding to max Pdet 31 cm H2O.  -Qmax 15 mL/s with a continuous, bell-shape flow curve and complete bladder emptying (final PVR 0 mL).  -BOOI is -10.1 which is not suggestive for bladder outlet obstruction.  -EMG remains quiet throughout filling and voiding.  -Fluoroscopy reveals a smooth-walled bladder without diverticuli or VUR. Bladder neck was closed during filling. Unable to obtain voiding images since patient required standing to void.    The patient will follow up as scheduled with Dr. Martinez to further discuss today's study results and make plans for how best to proceed.      - Given presumed normal genitourinary anatomy without other identifiable risk factors, antimicrobial prophylaxis was not provided today per department protocol.  The risk of UTI with VUDS is low at ~2.5-3%.      Thank you for allowing me to participate in the care of Mr. Faye Pugh and please don't hesitate to contact me with any questions or concerns.      Rowena Beltran PA-C  Urology Physician Assistant

## 2020-01-15 NOTE — NURSING NOTE
The following medication was given:     MEDICATION:  Omnipaque (Iohexol Injection)  ROUTE: Provider Administered  SITE: Provider Administered via catheter  DOSE: 240mL  LOT #: [id number]  : [email or web address]  EXPIRATION DATE: 3/29/2022  NDC#: [address]-45   Was there drug waste? No        Cathleen Tanner CMA  1/15/2020  3:05 PM

## 2020-01-15 NOTE — PROGRESS NOTES
PREPROCEDURE DIAGNOSES:    1. Urinary frequency    POSTPROCEDURE DIAGNOSES:  -Normal bladder capacity (370 mL) with normal filling sensations.  -Normal bladder compliance without DO/DOI or WESLY.  -Good detrusor contraction during voiding to max Pdet 31 cm H2O.  -Qmax 15 mL/s with a continuous, bell-shape flow curve and complete bladder emptying (final PVR 0 mL).  -BOOI is -10.1 which is not suggestive for bladder outlet obstruction.  -EMG remains quiet throughout filling and voiding.  -Fluoroscopy reveals a smooth-walled bladder without diverticuli or VUR. Bladder neck was closed during filling. Unable to obtain voiding images since patient required standing to void.    PROCEDURE:    1. Sterile urethral catheterization for measurement of residual urine volume.  2. Complex filling cystometrogram with measurement of bladder and rectal pressures.  3. Complex voiding cystometrogram with measurement of bladder and rectal pressures.  4. Electromyography of the pelvic floor during urodynamics.  5. Fluoroscopic imaging of the bladder during urodynamics, at least 3 views.    6. Interpretation of urodynamics and flouroscopic imaging.      INDICATIONS FOR PROCEDURE:  Mr. Faye Pugh is a pleasant 39 year old male with a history of nephrolithiasis and LUTS characterized by urinary frequency. Baseline video urodynamic assessment is requested today by Dr. Martinez to better characterize Mr. Faye Pugh's voiding dysfunction.      VOIDING DIARY:  Not performed.     DESCRIPTION OF PROCEDURE:  Risks, benefits, and alternatives to urodynamics were discussed with the patient and he wished to proceed.  Urodynamics are planned to better assess the primary etiology for Mr. Pugh's urologic dysfunction.  The patient does not currently take anticholinergic medications for his bladder - he does take alfuzosin but does not feel that it is helping.  After informed consent was obtained, the patient was taken to the procedure room where  uroflowmetry was performed. Findings below.     PRE-STUDY UROFLOWMETRY:  Not performed as patient had no urge to void.  Postvoid residual by catheter: 60 mL.  Pretest urine dipstick was negative for leukocytes and nitrites.    Next a 7F double-lumen urodynamics catheter was inserted into the bladder under sterile technique via urethra.  A 7F abdominal manometry catheter was placed in the rectum.  EMG pads were placed on both sides of the anal verge.  The bladder was filled with 200 mL of Iohexol at 50 mL/minute and serial pressures were recorded.  With coughing there was an appropriate rise in vesical and abdominal pressures with no change in detrusor pressure, confirming good study catheter placement.    DURING THE FILLING PHASE:  First sensation: 185 mL.  First Desire: 227 mL.  Strong Desire: 247 mL.  Maximum Capacity: filled to 289 mL; true long-term 370 mL based on final voided volume.     Uninhibited detrusor contractions: none.  Compliance: normal. PDet essentially 0 cm H2O throughout filling.  Continence: no DOI or WESLY.  EMG: concordant during filling.    DURING THE VOIDING PHASE:  Maximum detrusor contraction with void: 31 cm of H2O pressure.  Voided volume: 370 mL.  Maximum flow rate: 15 mL/sec.  Average flow rate: 8.5 mL/sec.  Postvoid Residual: 0 mL.  EMG activity: quiet.  Character of voiding curve: continuous, bell-shape.  BOOI: -10.1 (suggesting no obstruction - see key below)  [obstructed (AIKEN index [BOOI] ? 40); equivocal (no definite   obstruction; BOOI 20-40); and no obstruction (BOOI ? 20)]    FLUOROSCOPIC IMAGING OF THE BLADDER DURING URODYNAMICS:  Please note, image numbers on UDS tracings correlate with iSite series numbers on PACS images. Fluoroscopy during today's procedure demonstrated a smooth bladder wall without diverticulae or cellules.  No vesicoureteral reflux was observed.  The bladder neck was closed during filling. Unable to obtain voiding images since patient required standing to void.   After voiding to completion, all catheters were removed and the patient was brought back into the consultation room to further discuss today's study results.      ASSESSMENT/PLAN:  Mr. Faye Pugh is a pleasant 39 year old male with LUTS who demonstrated the following findings today on urodynamic evaluation:    -Normal bladder capacity (370 mL) with normal filling sensations.  -Normal bladder compliance without DO/DOI or WESLY.  -Good detrusor contraction during voiding to max Pdet 31 cm H2O.  -Qmax 15 mL/s with a continuous, bell-shape flow curve and complete bladder emptying (final PVR 0 mL).  -BOOI is -10.1 which is not suggestive for bladder outlet obstruction.  -EMG remains quiet throughout filling and voiding.  -Fluoroscopy reveals a smooth-walled bladder without diverticuli or VUR. Bladder neck was closed during filling. Unable to obtain voiding images since patient required standing to void.    The patient will follow up as scheduled with Dr. Martinez to further discuss today's study results and make plans for how best to proceed.      - Given presumed normal genitourinary anatomy without other identifiable risk factors, antimicrobial prophylaxis was not provided today per department protocol.  The risk of UTI with VUDS is low at ~2.5-3%.      Thank you for allowing me to participate in the care of Mr. Faye Pugh and please don't hesitate to contact me with any questions or concerns.      Rowena Beltran PA-C  Urology Physician Assistant

## 2020-01-15 NOTE — NURSING NOTE
Invasive Procedure Safety Checklist:    Procedure: Urodynamics Study    Action: Complete sections and checkboxes as appropriate.    Pre-procedure:  1. Patient ID Verified with 2 identifiers (Mily and  or MRN) : YES    2. Procedure and site verified with patient/designee (when able) : YES    3. Accurate consent documentation in medical record : YES    4. H&P (or appropriate assessment) documented in medical record : NO  H&P must be up to 30 days prior to procedure an updated within 24 hours of                 Procedure as applicable.     5. Relevant diagnostic and radiology test results appropriately labeled and displayed as applicable : NO    6. Blood products, implants, devices, and/or special equipment available for the procedure as applicable : NO    7. Procedure site(s) marked with provider initials [Exclusions: ] : NO    8. Marking not required. Reason : Yes  Procedure does not require site marking    Time Out:     Time-Out performed immediately prior to starting procedure, including verbal and active participation of all team members addressing: YES    1. Correct patient identity.  2. Confirmed that the correct side and site are marked.  3. An accurate procedure to be done.  4. Agreement on the procedure to be done.  5. Correct patient position.  6. Relevant images and results are properly labeled and appropriately displayed.  7. The need to administer antibiotics or fluids for irrigation purposes during the procedure as applicable.  8. Safety precautions based on patient history or medication use.    During Procedure: Verification of correct person, site, and procedure occurs any time the responsibility for care of the patient is transferred to another member of the care team.      Cathleen Tanner, [facility]

## 2020-02-04 ENCOUNTER — OFFICE VISIT (OUTPATIENT)
Dept: UROLOGY | Facility: CLINIC | Age: 40
End: 2020-02-04
Payer: COMMERCIAL

## 2020-02-04 VITALS
DIASTOLIC BLOOD PRESSURE: 80 MMHG | SYSTOLIC BLOOD PRESSURE: 124 MMHG | OXYGEN SATURATION: 99 % | WEIGHT: 130 LBS | HEART RATE: 87 BPM | HEIGHT: 63 IN | BODY MASS INDEX: 23.04 KG/M2

## 2020-02-04 DIAGNOSIS — R35.0 URINARY FREQUENCY: ICD-10-CM

## 2020-02-04 DIAGNOSIS — R39.89 POSSIBLE URINARY TRACT INFECTION: ICD-10-CM

## 2020-02-04 DIAGNOSIS — Z87.442 HISTORY OF KIDNEY STONES: Primary | ICD-10-CM

## 2020-02-04 LAB
ALBUMIN UR-MCNC: NEGATIVE MG/DL
APPEARANCE UR: CLEAR
BILIRUB UR QL STRIP: NEGATIVE
COLOR UR AUTO: YELLOW
GLUCOSE UR STRIP-MCNC: NEGATIVE MG/DL
HGB UR QL STRIP: NEGATIVE
KETONES UR STRIP-MCNC: NEGATIVE MG/DL
LEUKOCYTE ESTERASE UR QL STRIP: NEGATIVE
NITRATE UR QL: POSITIVE
PH UR STRIP: 7 PH (ref 5–7)
SOURCE: ABNORMAL
SP GR UR STRIP: 1.02 (ref 1–1.03)
UROBILINOGEN UR STRIP-ACNC: 0.2 EU/DL (ref 0.2–1)

## 2020-02-04 PROCEDURE — 87086 URINE CULTURE/COLONY COUNT: CPT | Performed by: UROLOGY

## 2020-02-04 PROCEDURE — 99213 OFFICE O/P EST LOW 20 MIN: CPT | Performed by: UROLOGY

## 2020-02-04 PROCEDURE — 81003 URINALYSIS AUTO W/O SCOPE: CPT | Performed by: UROLOGY

## 2020-02-04 ASSESSMENT — MIFFLIN-ST. JEOR: SCORE: 1404.81

## 2020-02-04 ASSESSMENT — PAIN SCALES - GENERAL: PAINLEVEL: NO PAIN (0)

## 2020-02-04 NOTE — PROGRESS NOTES
UROLOGIC DIAGNOSES:       CURRENT INTERVENTIONS:       HISTORY:     Patient presents for evaluation and management of left distal ureteral calculus.   Patient presented to the ED with pain on 05/14/2017. Found on CT scan to have a ~ 6mm ureteral calculus in the distal left ureter.   He notes that his pain is controlled with scheduled ibuprofen and some oxycodone, but he has not had pain over the last five days.     Patient s/p ureteroscopy for clearance (after initial stent placement for ureteral stenosis).   CT scan previously showed no fragments and no hydronephrosis     24 hr urine showed hypocitraturia, mild hypercalciuria at last visit.    Did repeat 24 hr urine that showed worsening hypocitraturia and extreme hypercalciuria.     CT scan today was negative.   Had repeat 24 hr urine that showed borderline hypercalciuria, borderline hyperoxaluria, improved but still low citrate.     Repeat 24 hr urine collection revealed hypocitraturia and low urine pH with other findings above improved.     Patient notes some newer onset lower urinary tract symptoms despite alfuzosin.   UDS did not reveal any significant findings.   Patient notes that his frequency and double voiding is worse with caffeine and better with water.         We discussed these findings and symptoms that related to PO intake.                 PAST MEDICAL HISTORY: History reviewed. No pertinent past medical history.    PAST SURGICAL HISTORY:   Past Surgical History:   Procedure Laterality Date     CYSTOSCOPY, RETROGRADES, INSERT STENT URETER(S), COMBINED  6/15/2017    Procedure: COMBINED CYSTOSCOPY, RETROGRADES, INSERT STENT URETER(S);  CYSTOSCOPY, LEFT URETEROSCOPY, LEFT STENT PLACEMENT ;  Surgeon: Amanda Martinez MD;  Location: [facility] OR     LASER HOLMIUM LITHOTRIPSY URETER(S), INSERT STENT, COMBINED Left 7/6/2017    Procedure: COMBINED CYSTOSCOPY, URETEROSCOPY, LASER HOLMIUM LITHOTRIPSY URETER(S), INSERT STENT;  CYSTOSCOPY, LEFT URETEROSCOPY,  "HOLMIUM LASER LITHOTRIPSY, LEFT STENT PLACEMENT ;  Surgeon: Amanda Martinez MD;  Location:  OR       FAMILY HISTORY: History reviewed. No pertinent family history.    SOCIAL HISTORY:   Social History     Tobacco Use     Smoking status: Never Smoker     Smokeless tobacco: Never Used   Substance Use Topics     Alcohol use: No       Current Outpatient Medications   Medication     alfuzosin ER (UROXATRAL) 10 MG 24 hr tablet     potassium citrate (UROCIT-K) 10 MEQ (1080 MG) CR tablet     No current facility-administered medications for this visit.          PHYSICAL EXAM:    /80   Pulse 87   Ht 1.6 m (5' 3\")   Wt 59 kg (130 lb)   SpO2 99%   BMI 23.03 kg/m      HEENT: Normocephalic and atraumatic   Cardiac: Not done  Back/Flank: Not done  CNS/PNS: Not done  Respiratory: Normal non-labored breathing  Abdomen: Soft nontender and nondistended  Peripheral Vascular: Not done  Mental Status: Not done    Penis: Not done  Scrotal Skin: Not done  Testicles: Not done  Epididymis: Not done  Digital Rectal Exam:     Cystoscopy: Not done    Imaging: CT scans from ED visit and from today reviewed with the patient     Urinalysis: UA RESULTS:  Recent Labs   Lab Test  05/30/17   1557   05/14/17   0755   COLOR  Yellow   < >  Yellow   APPEARANCE  Clear   < >  Slightly Cloudy   URINEGLC  Negative   < >  Negative   URINEBILI  Negative   < >  Negative   URINEKETONE  Negative   < >  Negative   SG  1.015   < >  1.013   UBLD  Negative   < >  Large*   URINEPH  7.0   < >  6.0   PROTEIN  Negative   < >  Negative   UROBILINOGEN  0.2   < >   --    NITRITE  Negative   < >  Negative   LEUKEST  Negative   < >  Negative   RBCU   --    --   101*   WBCU   --    --   1    < > = values in this interval not displayed.       PSA:     Post Void Residual:     Other labs: None today      IMPRESSION:  38 y/o M with distal ureteral calculus on the left s/p ureteroscopy for clearance now with some lower urinary tract symptoms including frequency " and slowed stream     PLAN:  Continue potassium citrate   Cut back on caffeine   Urine culture today given nitrite positive on U/a   24 hr urine x1   Follow up in six months         Total Time: 15 minutes                                       Total in Consultation: greater than 50%

## 2020-02-04 NOTE — NURSING NOTE
Chief Complaint   Patient presents with     Hx kidney stones and urinary frequency     Patient is here to follow up on recent Urodynamics Study results     Portia Silva EMT

## 2020-02-04 NOTE — LETTER
2/4/2020     RE: Faye Pugh  [id number]  [facility] [address]     Dear Colleague,    Thank you for referring your patient, Faye Pugh, to the [facility] at [facility]. Please see a copy of my visit note below.    UROLOGIC DIAGNOSES:       CURRENT INTERVENTIONS:       HISTORY:     Patient presents for evaluation and management of left distal ureteral calculus.   Patient presented to the ED with pain on 05/14/2017. Found on CT scan to have a ~ 6mm ureteral calculus in the distal left ureter.   He notes that his pain is controlled with scheduled ibuprofen and some oxycodone, but he has not had pain over the last five days.     Patient s/p ureteroscopy for clearance (after initial stent placement for ureteral stenosis).   CT scan previously showed no fragments and no hydronephrosis     24 hr urine showed hypocitraturia, mild hypercalciuria at last visit.    Did repeat 24 hr urine that showed worsening hypocitraturia and extreme hypercalciuria.     CT scan today was negative.   Had repeat 24 hr urine that showed borderline hypercalciuria, borderline hyperoxaluria, improved but still low citrate.     Repeat 24 hr urine collection revealed hypocitraturia and low urine pH with other findings above improved.     Patient notes some newer onset lower urinary tract symptoms despite alfuzosin.   UDS did not reveal any significant findings.   Patient notes that his frequency and double voiding is worse with caffeine and better with water.         We discussed these findings and symptoms that related to PO intake.                 PAST MEDICAL HISTORY: History reviewed. No pertinent past medical history.    PAST SURGICAL HISTORY:   Past Surgical History:   Procedure Laterality Date     CYSTOSCOPY, RETROGRADES, INSERT STENT URETER(S), COMBINED  6/15/2017    Procedure: COMBINED CYSTOSCOPY, RETROGRADES, INSERT STENT URETER(S);  CYSTOSCOPY,  "LEFT URETEROSCOPY, LEFT STENT PLACEMENT ;  Surgeon: Amanda Martinez MD;  Location:  OR     LASER HOLMIUM LITHOTRIPSY URETER(S), INSERT STENT, COMBINED Left 7/6/2017    Procedure: COMBINED CYSTOSCOPY, URETEROSCOPY, LASER HOLMIUM LITHOTRIPSY URETER(S), INSERT STENT;  CYSTOSCOPY, LEFT URETEROSCOPY, HOLMIUM LASER LITHOTRIPSY, LEFT STENT PLACEMENT ;  Surgeon: Amanda Martinez MD;  Location:  OR       FAMILY HISTORY: History reviewed. No pertinent family history.    SOCIAL HISTORY:   Social History     Tobacco Use     Smoking status: Never Smoker     Smokeless tobacco: Never Used   Substance Use Topics     Alcohol use: No       Current Outpatient Medications   Medication     alfuzosin ER (UROXATRAL) 10 MG 24 hr tablet     potassium citrate (UROCIT-K) 10 MEQ (1080 MG) CR tablet     No current facility-administered medications for this visit.          PHYSICAL EXAM:    /80   Pulse 87   Ht 1.6 m (5' 3\")   Wt 59 kg (130 lb)   SpO2 99%   BMI 23.03 kg/m       HEENT: Normocephalic and atraumatic   Cardiac: Not done  Back/Flank: Not done  CNS/PNS: Not done  Respiratory: Normal non-labored breathing  Abdomen: Soft nontender and nondistended  Peripheral Vascular: Not done  Mental Status: Not done    Penis: Not done  Scrotal Skin: Not done  Testicles: Not done  Epididymis: Not done  Digital Rectal Exam:     Cystoscopy: Not done    Imaging: CT scans from ED visit and from today reviewed with the patient     Urinalysis: UA RESULTS:  Recent Labs   Lab Test  05/30/17   1557   05/14/17   0755   COLOR  Yellow   < >  Yellow   APPEARANCE  Clear   < >  Slightly Cloudy   URINEGLC  Negative   < >  Negative   URINEBILI  Negative   < >  Negative   URINEKETONE  Negative   < >  Negative   SG  1.015   < >  1.013   UBLD  Negative   < >  Large*   URINEPH  7.0   < >  6.0   PROTEIN  Negative   < >  Negative   UROBILINOGEN  0.2   < >   --    NITRITE  Negative   < >  Negative   LEUKEST  Negative   < >  Negative   RBCU   " --    --   101*   WBCU   --    --   1    < > = values in this interval not displayed.       PSA:     Post Void Residual:     Other labs: None today      IMPRESSION:  38 y/o M with distal ureteral calculus on the left s/p ureteroscopy for clearance now with some lower urinary tract symptoms including frequency and slowed stream         PLAN:  Continue potassium citrate   Cut back on caffeine   Urine culture today given nitrite positive on U/a   24 hr urine x1   Follow up in six months       Total Time: 15 minutes                                       Total in Consultation: greater than 50%     Again, thank you for allowing me to participate in the care of your patient.      Sincerely,    Amanda Martinez MD

## 2020-02-05 LAB
BACTERIA SPEC CULT: NO GROWTH
Lab: NORMAL
SPECIMEN SOURCE: NORMAL

## 2020-03-10 ENCOUNTER — HEALTH MAINTENANCE LETTER (OUTPATIENT)
Age: 40
End: 2020-03-10

## 2020-05-20 DIAGNOSIS — R35.0 URINARY FREQUENCY: ICD-10-CM

## 2020-05-20 RX ORDER — ALFUZOSIN HYDROCHLORIDE 10 MG/1
10 TABLET, EXTENDED RELEASE ORAL DAILY
Qty: 30 TABLET | Refills: 11 | Status: SHIPPED | OUTPATIENT
Start: 2020-05-20 | End: 2020-08-05

## 2020-07-07 ENCOUNTER — TRANSFERRED RECORDS (OUTPATIENT)
Dept: HEALTH INFORMATION MANAGEMENT | Facility: CLINIC | Age: 40
End: 2020-07-07

## 2020-08-04 ENCOUNTER — TELEPHONE (OUTPATIENT)
Dept: UROLOGY | Facility: CLINIC | Age: 40
End: 2020-08-04

## 2020-08-05 ENCOUNTER — OFFICE VISIT (OUTPATIENT)
Dept: UROLOGY | Facility: CLINIC | Age: 40
End: 2020-08-05
Payer: COMMERCIAL

## 2020-08-05 VITALS
WEIGHT: 130 LBS | SYSTOLIC BLOOD PRESSURE: 110 MMHG | HEART RATE: 81 BPM | OXYGEN SATURATION: 98 % | BODY MASS INDEX: 23.04 KG/M2 | DIASTOLIC BLOOD PRESSURE: 60 MMHG | HEIGHT: 63 IN

## 2020-08-05 DIAGNOSIS — R35.0 URINARY FREQUENCY: ICD-10-CM

## 2020-08-05 DIAGNOSIS — N20.0 CALCULUS OF KIDNEY: ICD-10-CM

## 2020-08-05 DIAGNOSIS — Z87.442 HISTORY OF KIDNEY STONES: Primary | ICD-10-CM

## 2020-08-05 LAB
ALBUMIN UR-MCNC: NEGATIVE MG/DL
APPEARANCE UR: CLEAR
BILIRUB UR QL STRIP: NEGATIVE
COLOR UR AUTO: YELLOW
GLUCOSE UR STRIP-MCNC: NEGATIVE MG/DL
HGB UR QL STRIP: NEGATIVE
KETONES UR STRIP-MCNC: NEGATIVE MG/DL
LEUKOCYTE ESTERASE UR QL STRIP: NEGATIVE
NITRATE UR QL: NEGATIVE
PH UR STRIP: 7.5 PH (ref 5–7)
SOURCE: ABNORMAL
SP GR UR STRIP: 1.02 (ref 1–1.03)
UROBILINOGEN UR STRIP-ACNC: 0.2 EU/DL (ref 0.2–1)

## 2020-08-05 PROCEDURE — 99213 OFFICE O/P EST LOW 20 MIN: CPT | Performed by: STUDENT IN AN ORGANIZED HEALTH CARE EDUCATION/TRAINING PROGRAM

## 2020-08-05 PROCEDURE — 81003 URINALYSIS AUTO W/O SCOPE: CPT | Performed by: STUDENT IN AN ORGANIZED HEALTH CARE EDUCATION/TRAINING PROGRAM

## 2020-08-05 RX ORDER — POTASSIUM CITRATE 10 MEQ/1
10 TABLET, EXTENDED RELEASE ORAL
Qty: 180 TABLET | Refills: 5 | Status: SHIPPED | OUTPATIENT
Start: 2020-08-05 | End: 2021-08-04

## 2020-08-05 ASSESSMENT — MIFFLIN-ST. JEOR: SCORE: 1399.81

## 2020-08-05 ASSESSMENT — PAIN SCALES - GENERAL: PAINLEVEL: NO PAIN (0)

## 2020-08-05 NOTE — PATIENT INSTRUCTIONS
Stop alfuzosin    Continue the potassium citrate    Reduce the sodium and calcium in your diet and eat less protein (meat)    See me in a year or earlier if you have issues

## 2020-08-05 NOTE — NURSING NOTE
Chief Complaint   Patient presents with     Follow Up     patient is here to discuss 24 hr urine.      Hoa Richard, CMA

## 2020-08-05 NOTE — LETTER
"8/5/2020       RE: aFye Pugh  [address]  [facility] [address]     Dear Colleague,    Thank you for referring your patient, Faye Pugh, to the [facility] at [facility]. Please see a copy of my visit note below.    CHIEF COMPLAINT   Faye Pugh who is a 40 year old male returns today for follow-up of h/o nephrolithiasis.      HPI   Faye Pugh is 40 year old male who presents with a history of nephrolithiasis and lower urinary tract symptoms.  Initial stone episode 6 mm left distal ureteral stone s/p ureteral stent followed by left ureteroscopy 2017. Since then has not had another stone episode. Numerous Litholinks with various dietary recommendations. Is on potassium citrate 10 mEq, increased to tid, doing well with this. Also had been having LUTS, mostly urinary frequency and urgency. Was started on alfuzosin without improvement. A UDS in Jan 2020 was unremarkable. He cut caffeine (significant coffee drinker) with much improvement and resolution of the LUTS.    Most recent CT scan 4/2019 with no renal calculi    PHYSICAL EXAM  Patient is a 40 year old  male   Vitals: Blood pressure 110/60, pulse 81, height 1.6 m (5' 3\"), weight 59 kg (130 lb), SpO2 98 %.  Body mass index is 23.03 kg/m .  General Appearance Adult:   Alert, no acute distress, oriented  HENT: throat/mouth:normal, good dentition  Lungs: no respiratory distress, or pursed lip breathing  Heart: No obvious jugular venous distension present  Abdomen: not examined  Musculoskeltal: extremities normal, no peripheral edema  Skin: no suspicious lesions or rashes  Neuro: Alert, oriented, speech and mentation normal  Psych: affect and mood normal  Gait: Normal  : not examined    All pertinent imaging reviewed:    CT April 2019: no stones    UDS 1/15/2020.   -Normal bladder capacity (370 mL) with normal filling sensations.  -Normal bladder compliance without DO/DOI or " WESLY.  -Good detrusor contraction during voiding to max Pdet 31 cm H2O.  -Qmax 15 mL/s with a continuous, bell-shape flow curve and complete bladder emptying (final PVR 0 mL).  -BOOI is -10.1 which is not suggestive for bladder outlet obstruction.  -EMG remains quiet throughout filling and voiding.  -Fluoroscopy reveals a smooth-walled bladder without diverticuli or VUR. Bladder neck was closed during filling. Unable to obtain voiding images since patient required standing to void.    ASSESSMENT and PLAN   40 year old male who presents with a history of nephrolithiasis and lower urinary tract symptoms. No stone episodes since 2017, most recent CT in 2019 without stones. Litholink last month with hypercalciuria and elevated sodium, also notes high protein intake. Counseled patient that since he has not had any stone episodes since 2017 and the most recent CT was clean, can reduce intensity of followup. LUTS have resolved since decreasing caffeine, so can stop alfuzosin    Patient instructions  Stop alfuzosin    Continue the potassium citrate [reordered one year supply 10 mEq tid]    Reduce the sodium and calcium in your diet and eat less protein (meat)    See me in a year with a Litholink prior or earlier if you have issues      I spent over 15 minutes with the patient.  Over half this time was spent on counseling regarding urinary stone prevention.    Chong Nguyen MD   [facility]rology  [facility] Phone: [phone]

## 2020-08-05 NOTE — PROGRESS NOTES
"CHIEF COMPLAINT   Faye Pugh who is a 40 year old male returns today for follow-up of h/o nephrolithiasis.      HPI   Faye Pugh is 40 year old male who presents with a history of nephrolithiasis and lower urinary tract symptoms.  Initial stone episode 6 mm left distal ureteral stone s/p ureteral stent followed by left ureteroscopy 2017. Since then has not had another stone episode. Numerous Litholinks with various dietary recommendations. Is on potassium citrate 10 mEq, increased to tid, doing well with this. Also had been having LUTS, mostly urinary frequency and urgency. Was started on alfuzosin without improvement. A UDS in Jan 2020 was unremarkable. He cut caffeine (significant coffee drinker) with much improvement and resolution of the LUTS.    Most recent CT scan 4/2019 with no renal calculi    PHYSICAL EXAM  Patient is a 40 year old  male   Vitals: Blood pressure 110/60, pulse 81, height 1.6 m (5' 3\"), weight 59 kg (130 lb), SpO2 98 %.  Body mass index is 23.03 kg/m .  General Appearance Adult:   Alert, no acute distress, oriented  HENT: throat/mouth:normal, good dentition  Lungs: no respiratory distress, or pursed lip breathing  Heart: No obvious jugular venous distension present  Abdomen: not examined  Musculoskeltal: extremities normal, no peripheral edema  Skin: no suspicious lesions or rashes  Neuro: Alert, oriented, speech and mentation normal  Psych: affect and mood normal  Gait: Normal  : not examined    All pertinent imaging reviewed:    CT April 2019: no stones    UDS 1/15/2020.   -Normal bladder capacity (370 mL) with normal filling sensations.  -Normal bladder compliance without DO/DOI or WESLY.  -Good detrusor contraction during voiding to max Pdet 31 cm H2O.  -Qmax 15 mL/s with a continuous, bell-shape flow curve and complete bladder emptying (final PVR 0 mL).  -BOOI is -10.1 which is not suggestive for bladder outlet obstruction.  -EMG remains quiet throughout filling and " voiding.  -Fluoroscopy reveals a smooth-walled bladder without diverticuli or VUR. Bladder neck was closed during filling. Unable to obtain voiding images since patient required standing to void.    ASSESSMENT and PLAN   40 year old male who presents with a history of nephrolithiasis and lower urinary tract symptoms. No stone episodes since 2017, most recent CT in 2019 without stones. Litholink last month with hypercalciuria and elevated sodium, also notes high protein intake. Counseled patient that since he has not had any stone episodes since 2017 and the most recent CT was clean, can reduce intensity of followup. LUTS have resolved since decreasing caffeine, so can stop alfuzosin    Patient instructions  Stop alfuzosin    Continue the potassium citrate [reordered one year supply 10 mEq tid]    Reduce the sodium and calcium in your diet and eat less protein (meat)    See me in a year with a Litholink prior or earlier if you have issues      I spent over 15 minutes with the patient.  Over half this time was spent on counseling regarding urinary stone prevention.    Chong Nguyen MD   [facility] Urology  [facility] Phone: [phone]

## 2020-12-27 ENCOUNTER — HEALTH MAINTENANCE LETTER (OUTPATIENT)
Age: 40
End: 2020-12-27

## 2021-04-23 ENCOUNTER — IMMUNIZATION (OUTPATIENT)
Dept: NURSING | Facility: CLINIC | Age: 41
End: 2021-04-23
Payer: COMMERCIAL

## 2021-04-23 PROCEDURE — 91301 PR COVID VAC MODERNA 100 MCG/0.5 ML IM: CPT

## 2021-04-23 PROCEDURE — 0011A PR COVID VAC MODERNA 100 MCG/0.5 ML IM: CPT

## 2021-04-24 ENCOUNTER — HEALTH MAINTENANCE LETTER (OUTPATIENT)
Age: 41
End: 2021-04-24

## 2021-05-21 ENCOUNTER — IMMUNIZATION (OUTPATIENT)
Dept: NURSING | Facility: CLINIC | Age: 41
End: 2021-05-21
Attending: INTERNAL MEDICINE
Payer: COMMERCIAL

## 2021-05-21 PROCEDURE — 91301 PR COVID VAC MODERNA 100 MCG/0.5 ML IM: CPT

## 2021-05-21 PROCEDURE — 0012A PR COVID VAC MODERNA 100 MCG/0.5 ML IM: CPT

## 2021-07-08 ENCOUNTER — TRANSFERRED RECORDS (OUTPATIENT)
Dept: HEALTH INFORMATION MANAGEMENT | Facility: CLINIC | Age: 41
End: 2021-07-08

## 2021-08-04 ENCOUNTER — OFFICE VISIT (OUTPATIENT)
Dept: UROLOGY | Facility: CLINIC | Age: 41
End: 2021-08-04
Payer: COMMERCIAL

## 2021-08-04 VITALS
DIASTOLIC BLOOD PRESSURE: 72 MMHG | BODY MASS INDEX: 22.66 KG/M2 | WEIGHT: 127.87 LBS | HEART RATE: 88 BPM | OXYGEN SATURATION: 99 % | HEIGHT: 63 IN | SYSTOLIC BLOOD PRESSURE: 106 MMHG

## 2021-08-04 DIAGNOSIS — Z87.442 HISTORY OF KIDNEY STONES: Primary | ICD-10-CM

## 2021-08-04 DIAGNOSIS — Z87.442 HISTORY OF KIDNEY STONES: ICD-10-CM

## 2021-08-04 DIAGNOSIS — R82.994 HYPERCALCIURIA: ICD-10-CM

## 2021-08-04 DIAGNOSIS — N32.81 OVERACTIVE BLADDER: Primary | ICD-10-CM

## 2021-08-04 DIAGNOSIS — N20.0 CALCULUS OF KIDNEY: ICD-10-CM

## 2021-08-04 LAB
ALBUMIN UR-MCNC: NEGATIVE MG/DL
APPEARANCE UR: CLEAR
BILIRUB UR QL STRIP: NEGATIVE
COLOR UR AUTO: YELLOW
GLUCOSE UR STRIP-MCNC: NEGATIVE MG/DL
HGB UR QL STRIP: ABNORMAL
KETONES UR STRIP-MCNC: NEGATIVE MG/DL
LEUKOCYTE ESTERASE UR QL STRIP: NEGATIVE
NITRATE UR QL: NEGATIVE
PH UR STRIP: 7 [PH] (ref 5–7)
SP GR UR STRIP: >=1.03 (ref 1–1.03)
UROBILINOGEN UR STRIP-ACNC: 0.2 E.U./DL

## 2021-08-04 PROCEDURE — 99214 OFFICE O/P EST MOD 30 MIN: CPT | Performed by: STUDENT IN AN ORGANIZED HEALTH CARE EDUCATION/TRAINING PROGRAM

## 2021-08-04 PROCEDURE — 81003 URINALYSIS AUTO W/O SCOPE: CPT | Mod: QW | Performed by: STUDENT IN AN ORGANIZED HEALTH CARE EDUCATION/TRAINING PROGRAM

## 2021-08-04 RX ORDER — POTASSIUM CITRATE 10 MEQ/1
10 TABLET, EXTENDED RELEASE ORAL
Qty: 180 TABLET | Refills: 5 | Status: SHIPPED | OUTPATIENT
Start: 2021-08-04 | End: 2021-12-08

## 2021-08-04 RX ORDER — OXYBUTYNIN CHLORIDE 10 MG/1
10 TABLET, EXTENDED RELEASE ORAL DAILY
Qty: 90 TABLET | Refills: 3 | Status: SHIPPED | OUTPATIENT
Start: 2021-08-04 | End: 2022-12-07

## 2021-08-04 ASSESSMENT — PAIN SCALES - GENERAL: PAINLEVEL: NO PAIN (0)

## 2021-08-04 ASSESSMENT — MIFFLIN-ST. JEOR: SCORE: 1385.13

## 2021-08-04 NOTE — PATIENT INSTRUCTIONS
Reduce meat (animal protein) and salt in diet  Continue to drink lots of fluid  Continue potassium citrate  Trial oxybutinin 10 mg XL  Come back in 2 months        Patient Education     Preventing Kidney Stones  If you ve had a kidney stone, you may worry that you ll have another. Removing or passing your stone doesn t prevent future stones. But with your healthcare provider s help, you can reduce your risk of forming new stones. Follow up with your healthcare provider to help find new stones. Depending on your medical condition, you may need follow-up every 3 months to a year for the rest of your life.     Drink lots of water  Staying well-hydrated is the best way to reduce your risk of future stones. Drink 8 12-ounce glasses of water daily. Have 2 with each meal and 2 between meals. Keep track of your intake. Try keeping a pitcher of water nearby during the day and at night.   Take medicines if needed  Medicines, including vitamins and minerals, may be prescribed for certain types of stones. You may want to write your doses and medicine times on a calendar. Some medicines decrease stone-forming chemicals in your blood. Others help prevent those chemicals from crystallizing in urine. Still others help keep a normal acid balance in your urine.   Follow your prescribed diet  Your healthcare provider will tell you which foods contain the chemicals you should avoid. Your healthcare provider may also suggest talking to a dietitian. He or she can help you plan meals you ll enjoy. These meals won t put you at risk for future stones. Bring your spouse, partner, or close friend with you when you meet with the dietitian so you can have support for your necessary diet changes.   You may be told to limit certain foods, depending on which type of stones you ve had. You should limit the amount of salt in your food to about 2 grams a day. This will help prevent most types of kidney stones. Make sure you get an adequate amount of  calcium in your diet.   For calcium oxalate stones: Limit animal protein, such as meat, eggs, and fish. Limit grapefruit juice and alcohol. Limit high-oxalate foods (such as cola, tea, chocolate, spinach, rhubarb, wheat bran, and peanuts).   For uric acid stones: Limit high-purine foods, such as mushrooms, peas, beans, anchovies, meat, poultry, shellfish, and organ meats. These foods increase uric acid production.   For cystine stones: Limit high-methionine foods (fish is the most common, but eggs and meats, also). These foods increase production of cystine.   [employer] last reviewed this educational content on 4/1/2020 2000-2021 [employer]. All rights reserved. This information is not intended as a substitute for professional medical care. Always follow your healthcare professional's instructions.

## 2021-08-04 NOTE — PROGRESS NOTES
"CHIEF COMPLAINT   Faye Pugh who is a 41 year old male returns today for follow-up of nephrolithiasis, urinary frequency    HPI   Faye Pugh is a 41 year old male who presents with a history of nephrolithiasis last seen 8/5/2020, see my note from that time:    \"Initial stone episode 6 mm left distal ureteral stone s/p ureteral stent followed by left ureteroscopy 2017. Since then has not had another stone episode. Numerous Litholinks with various dietary recommendations. Is on potassium citrate 10 mEq, increased to tid, doing well with this. Also had been having LUTS, mostly urinary frequency and urgency. Was started on alfuzosin without improvement. A UDS in Jan 2020 was unremarkable. He cut caffeine (significant coffee drinker) with much improvement and resolution of the LUTS. Most recent CT scan 4/2019 with no renal calculi\"    Has not had any pain in his sides or recurrent kidney stones    Still having some issues with urinary frequency    Is out of potassium citrate        PHYSICAL EXAM  Patient is a 41 year old  male   Vitals: Blood pressure 106/72, pulse 88, height 1.6 m (5' 3\"), weight 58 kg (127 lb 13.9 oz), SpO2 99 %.  Body mass index is 22.65 kg/m .  General Appearance Adult:   Alert, no acute distress, oriented  HENT: throat/mouth:normal, good dentition  Lungs: no respiratory distress, or pursed lip breathing  Heart: No obvious jugular venous distension present  Abdomen:nondistended  Musculoskeltal: extremities normal, no peripheral edema  Skin: no suspicious lesions or rashes  Neuro: Alert, oriented, speech and mentation normal  Psych: affect and mood normal  Gait: Normal  :deferred    Litholink reviewed. Mild hypercalciuria. Good urine volume. Mildly increasing uric acid      ASSESSMENT and PLAN  41 year old male returns today for follow-up of nephrolithiasis, urinary frequency    Discussed litholink results, and copy given to patient. Main recommendation is to reduce animal protein    Having " worsening urinary frequency. Prior UDS was unremarkable. Alpha blocker was not effective in the past so will trial anticholinergic for overactive bladder. S/e including dry mouth and constipation discussed    Reduce meat (animal protein) and salt in diet  Continue to drink lots of fluid  Continue potassium citrate, refilled  Trial oxybutinin 10 mg XL  Follow up in 2 months  Will plan to get repeat CT abd/pelvis w/o contrast within the next year to follow up kidney stones    Chogn Nguyen MD   [facility] Urology  [facility] Phone: [phone]

## 2021-08-04 NOTE — LETTER
"8/4/2021       RE: Faye Pugh  [address]  [facility] [address]     Dear Colleague,    Thank you for referring your patient, Faye Pugh, to the [facility] at [facility]. Please see a copy of my visit note below.    CHIEF COMPLAINT   Faye Pugh who is a 41 year old male returns today for follow-up of nephrolithiasis, urinary frequency    HPI   Faye Pugh is a 41 year old male who presents with a history of nephrolithiasis last seen 8/5/2020, see my note from that time:    \"Initial stone episode 6 mm left distal ureteral stone s/p ureteral stent followed by left ureteroscopy 2017. Since then has not had another stone episode. Numerous Litholinks with various dietary recommendations. Is on potassium citrate 10 mEq, increased to tid, doing well with this. Also had been having LUTS, mostly urinary frequency and urgency. Was started on alfuzosin without improvement. A UDS in Jan 2020 was unremarkable. He cut caffeine (significant coffee drinker) with much improvement and resolution of the LUTS. Most recent CT scan 4/2019 with no renal calculi\"    Has not had any pain in his sides or recurrent kidney stones    Still having some issues with urinary frequency    Is out of potassium citrate        PHYSICAL EXAM  Patient is a 41 year old  male   Vitals: Blood pressure 106/72, pulse 88, height 1.6 m (5' 3\"), weight 58 kg (127 lb 13.9 oz), SpO2 99 %.  Body mass index is 22.65 kg/m .  General Appearance Adult:   Alert, no acute distress, oriented  HENT: throat/mouth:normal, good dentition  Lungs: no respiratory distress, or pursed lip breathing  Heart: No obvious jugular venous distension present  Abdomen:nondistended  Musculoskeltal: extremities normal, no peripheral edema  Skin: no suspicious lesions or rashes  Neuro: Alert, oriented, speech and mentation normal  Psych: affect and mood normal  Gait: Normal  :deferred    Litholink " reviewed. Mild hypercalciuria. Good urine volume. Mildly increasing uric acid      ASSESSMENT and PLAN  41 year old male returns today for follow-up of nephrolithiasis, urinary frequency    Discussed litholink results, and copy given to patient. Main recommendation is to reduce animal protein    Having worsening urinary frequency. Prior UDS was unremarkable. Alpha blocker was not effective in the past so will trial anticholinergic for overactive bladder. S/e including dry mouth and constipation discussed    Reduce meat (animal protein) and salt in diet  Continue to drink lots of fluid  Continue potassium citrate, refilled  Trial oxybutinin 10 mg XL  Follow up in 2 months  Will plan to get repeat CT abd/pelvis w/o contrast within the next year to follow up kidney stones    Chong Nguyen MD   [facility] Urology  [facility] Phone: [phone]

## 2021-08-04 NOTE — NURSING NOTE
Chief Complaint   Patient presents with     history of stones     one year follow up   Ely Jiménez LPN

## 2021-10-04 ENCOUNTER — OFFICE VISIT (OUTPATIENT)
Dept: UROLOGY | Facility: CLINIC | Age: 41
End: 2021-10-04
Payer: COMMERCIAL

## 2021-10-04 VITALS
DIASTOLIC BLOOD PRESSURE: 70 MMHG | HEART RATE: 80 BPM | SYSTOLIC BLOOD PRESSURE: 124 MMHG | WEIGHT: 127 LBS | BODY MASS INDEX: 22.5 KG/M2 | HEIGHT: 63 IN

## 2021-10-04 DIAGNOSIS — R39.9 LOWER URINARY TRACT SYMPTOMS (LUTS): Primary | ICD-10-CM

## 2021-10-04 DIAGNOSIS — Z87.442 HISTORY OF KIDNEY STONES: ICD-10-CM

## 2021-10-04 LAB
ALBUMIN UR-MCNC: NEGATIVE MG/DL
APPEARANCE UR: CLEAR
BILIRUB UR QL STRIP: NEGATIVE
COLOR UR AUTO: YELLOW
GLUCOSE UR STRIP-MCNC: NEGATIVE MG/DL
HGB UR QL STRIP: NEGATIVE
KETONES UR STRIP-MCNC: NEGATIVE MG/DL
LEUKOCYTE ESTERASE UR QL STRIP: NEGATIVE
NITRATE UR QL: NEGATIVE
PH UR STRIP: 7 [PH] (ref 5–7)
PR INTERVAL - MUSE: NORMAL
SP GR UR STRIP: 1.02 (ref 1–1.03)
UROBILINOGEN UR STRIP-ACNC: 0.2 E.U./DL

## 2021-10-04 PROCEDURE — 99213 OFFICE O/P EST LOW 20 MIN: CPT | Mod: 25 | Performed by: STUDENT IN AN ORGANIZED HEALTH CARE EDUCATION/TRAINING PROGRAM

## 2021-10-04 PROCEDURE — 81003 URINALYSIS AUTO W/O SCOPE: CPT | Mod: QW | Performed by: STUDENT IN AN ORGANIZED HEALTH CARE EDUCATION/TRAINING PROGRAM

## 2021-10-04 PROCEDURE — 51798 US URINE CAPACITY MEASURE: CPT | Performed by: STUDENT IN AN ORGANIZED HEALTH CARE EDUCATION/TRAINING PROGRAM

## 2021-10-04 ASSESSMENT — MIFFLIN-ST. JEOR: SCORE: 1381.2

## 2021-10-04 ASSESSMENT — PAIN SCALES - GENERAL: PAINLEVEL: NO PAIN (0)

## 2021-10-04 NOTE — PROGRESS NOTES
"CHIEF COMPLAINT   Faye Pugh who is a 41 year old male returns today for follow-up of nephrolithiasis, overactive bladder.      HPI   Faye Pugh is a 41 year old male who presents with a history of nephrolithiasis, overactive bladder.      At last visit was having worsening urinary frequency. Was started on anticholinergic. He is not sure if it helping very much. Still having urinary frequency, especially shortly after fluid intake    Now feels like he has to push somewhat to get urine out. Denies gross hematuria    AUA symptom score 1-3-0-0-0-2-1 = 7 QOL 3 mixed    PHYSICAL EXAM  Patient is a 41 year old  male   Vitals: Blood pressure 124/70, pulse 80, height 1.6 m (5' 3\"), weight 57.6 kg (127 lb).  Body mass index is 22.5 kg/m .  General Appearance Adult:   Alert, no acute distress, oriented  HENT: throat/mouth:normal, good dentition  Lungs: no respiratory distress, or pursed lip breathing  Heart: No obvious jugular venous distension present  Musculoskeltal: extremities normal, no peripheral edema  Skin: no suspicious lesions or rashes  Neuro: Alert, oriented, speech and mentation normal  Psych: affect and mood normal  Gait: Normal    All pertinent imaging reviewed:    PVR 4 ml    ASSESSMENT and PLAN  41 year old male who presents with a history of nephrolithiasis, overactive bladder. Minimal improvement in symptoms with oxybutinin. Emptying well with low PVR. Will refer to PFPT. Return with CT abd/pelvis w/o contrast to assess stone burden; possible a distal stone could be causing urinary frequency     Continue oxybutinin  Referral to pelvic floor physical therapy  Return 2 months with CT abd/pelvis w/o contrast prior to assess stone burden      Chong Nguyen MD   [facility] Urology  [facility] Phone: [phone]    "

## 2021-10-04 NOTE — LETTER
"10/4/2021       RE: Faye Pugh  [address]  [facility] [address]     Dear Colleague,    Thank you for referring your patient, Faye Pugh, to the [facility] at [facility]. Please see a copy of my visit note below.    CHIEF COMPLAINT   Faye Pugh who is a 41 year old male returns today for follow-up of nephrolithiasis, overactive bladder.      HPI   Faye Pugh is a 41 year old male who presents with a history of nephrolithiasis, overactive bladder.      At last visit was having worsening urinary frequency. Was started on anticholinergic. He is not sure if it helping very much. Still having urinary frequency, especially shortly after fluid intake    Now feels like he has to push somewhat to get urine out. Denies gross hematuria    AUA symptom score 1-3-0-0-0-2-1 = 7 QOL 3 mixed    PHYSICAL EXAM  Patient is a 41 year old  male   Vitals: Blood pressure 124/70, pulse 80, height 1.6 m (5' 3\"), weight 57.6 kg (127 lb).  Body mass index is 22.5 kg/m .  General Appearance Adult:   Alert, no acute distress, oriented  HENT: throat/mouth:normal, good dentition  Lungs: no respiratory distress, or pursed lip breathing  Heart: No obvious jugular venous distension present  Musculoskeltal: extremities normal, no peripheral edema  Skin: no suspicious lesions or rashes  Neuro: Alert, oriented, speech and mentation normal  Psych: affect and mood normal  Gait: Normal    All pertinent imaging reviewed:    PVR 4 ml    ASSESSMENT and PLAN  41 year old male who presents with a history of nephrolithiasis, overactive bladder. Minimal improvement in symptoms with oxybutinin. Emptying well with low PVR. Will refer to PFPT. Return with CT abd/pelvis w/o contrast to assess stone burden; possible a distal stone could be causing urinary frequency     Continue oxybutinin  Referral to pelvic floor physical therapy  Return 2 months with CT abd/pelvis w/o contrast " prior to assess stone burden      Chong Nguyen MD   [facility] Urology  [facility] Phone: [phone]

## 2021-10-04 NOTE — PATIENT INSTRUCTIONS
Continue oxybutinin  Referral to pelvic floor physical therapy  Return 2 months with CT abd/pelvis w/o contrast prior to assess stone burden

## 2021-10-09 ENCOUNTER — HEALTH MAINTENANCE LETTER (OUTPATIENT)
Age: 41
End: 2021-10-09

## 2021-11-22 ENCOUNTER — THERAPY VISIT (OUTPATIENT)
Dept: PHYSICAL THERAPY | Facility: CLINIC | Age: 41
End: 2021-11-22
Attending: STUDENT IN AN ORGANIZED HEALTH CARE EDUCATION/TRAINING PROGRAM
Payer: COMMERCIAL

## 2021-11-22 DIAGNOSIS — R39.9 LOWER URINARY TRACT SYMPTOMS (LUTS): ICD-10-CM

## 2021-11-22 DIAGNOSIS — M62.89 PELVIC FLOOR DYSFUNCTION: ICD-10-CM

## 2021-11-22 DIAGNOSIS — R35.0 URINARY FREQUENCY: ICD-10-CM

## 2021-11-22 PROCEDURE — 97110 THERAPEUTIC EXERCISES: CPT | Mod: GP | Performed by: PHYSICAL THERAPIST

## 2021-11-22 PROCEDURE — 97161 PT EVAL LOW COMPLEX 20 MIN: CPT | Mod: GP | Performed by: PHYSICAL THERAPIST

## 2021-11-22 PROCEDURE — 97112 NEUROMUSCULAR REEDUCATION: CPT | Mod: GP | Performed by: PHYSICAL THERAPIST

## 2021-11-22 PROCEDURE — 97535 SELF CARE MNGMENT TRAINING: CPT | Mod: GP | Performed by: PHYSICAL THERAPIST

## 2021-11-22 NOTE — PROGRESS NOTES
Physical Therapy Initial Evaluation  Subjective:  The history is provided by the patient. No  was used.   Patient Health History  Faye Pugh being seen for Frequent urination.     Problem began: 1/18/2019.      Pain is reported as 0/10 on pain scale.  General health as reported by patient is good.  Pertinent medical history includes: none.     Medical allergies: none.   Surgeries include:  None.    Current medications:  Other. Other medications details: Kidney stone prevention and to remedy the frequent of urination issue.       Primary job tasks include:  Computer work.                SUBJECTIVE:  Patient reports onset of symptoms about 2-3 years and has been consistent since then.  Pt reports that he has had urinary frequency and feeling of having to go to the bathroom.  He did go to urology and got on medication and felt that it was difficult to get urine stream started.  He now feels like his bladder is more calmed down, but he has to push to urinate.  He is on oxybutin, and since taking it he is still having the urge and frequency but it is taking ~10-15 seconds to start the stream.  He does feel like his stream is steady, but he does note that it is less steady when he is getting up to urinate at night.  History of bleeding with stool and colonoscopy detected internal hemorrhoid, so he does report eating more vegetables.  Since onset symptoms have been getting better, worse or staying the same? Staying the same    Urination:  Do you leak on the way to the bathroom or with a strong urge to void? No    Do you leak with cough,sneeze, jumping, running?No   Any other activities that cause leaking? Yes, 1-2x weekly he feels small drops after urinating   Do you have triggers that make you feel you can't wait to go to the bathroom? No   Type of pad and number used per day? n/a  When you leak what is the amount? Small drops, rarely    How long can you delay the need to urinate? 11 - 30 minutes.    How many times do you get up to urinate at night? 0-1 (if everyone wakes up) 6-8 times, but does got 3-4 times in 2 hours if he drinks water and coffee    Can you stop the flow of urine when on the toilet? Yes  Is the volume of urine passed usually: medium. (8sec rule=  250ml with average bladder storing  400-600ml)    Do you strain to pass urine? Yes, once in a while (more so at night or 2nd time going in a short time frame)  Do you have a slow or hesitant urinary stream? Yes, after taking new med  Do you have difficulty initiating the urine stream? Yes    Fluid intake(one glass is 8oz or one cup) 6 glasses/day, 2 caffinated glasses/day  0 alcohol glasses/day.    Bowel habits:  Frequency of bowel movements?1 times a day  Consistancy of stool? soft, [place] Stool Scale 5-6  Do you ignore the urge to defecate? No  Do you strain to pass stool? No    Pelvic Pain:  Do you have pain with erections? No  Do you have pain with ejaculation? No  Do you have pain with sitting?  No  Any other activities that produce the pelvic pain    Are you sexually active?Yes  Have you ever been worried for your physical safety? No  Any abdominal or pelvic surgeries? None, history of kidney stones  Are you having any regular exercise?no  Have you practiced the PF(kegel) exercises for 4 or more weeks?no  Changed diet lately? no                            Objective:  System         Lumbar/SI Evaluation  ROM:  AROM Lumbar: normal                                                Pelvic Dysfunction Evaluation:        Flexibility:    Tightness present at:Hamstrings    Abdominal Wall:  normal        Pelvic Clock Exam:    Ischiocavernosis pain:  -  Bulbocavernosis pain:  -  Transverse Perineal:  -    Perineal Body:  -      External Assessment:    Skin Condition:  Normal      Tissue Symmetry:  Normal  Introitus:  Normal  Muscle Contraction/Perineal Mobility:  Slight lift, no urogential triangle descent  Internal Assessment:  NA                         Hip Evaluation  HIP AROM:  AROM:    Left Hip:     Normal    Right Hip:   Normal                                     General     ROS    Assessment/Plan:    Patient is a 41 year old male with pelvic complaints.    Patient has the following significant findings with corresponding treatment plan.                Diagnosis 1:  Urinary frequency  Decreased joint mobility - manual therapy and therapeutic exercise  Decreased strength - therapeutic exercise and therapeutic activities  Impaired muscle performance - neuro re-education  Decreased function - therapeutic activities    Therapy Evaluation Codes:   1) History comprised of:   Personal factors that impact the plan of care:      None.    Comorbidity factors that impact the plan of care are:      None.     Medications impacting care: None.  2) Examination of Body Systems comprised of:   Body structures and functions that impact the plan of care:      Pelvis.   Activity limitations that impact the plan of care are:      Reading/Computer work, Sitting and Frequency.  3) Clinical presentation characteristics are:   Stable/Uncomplicated.  4) Decision-Making    Low complexity using standardized patient assessment instrument and/or measureable assessment of functional outcome.  Cumulative Therapy Evaluation is: Low complexity.    Previous and current functional limitations:  (See Goal Flow Sheet for this information)    Short term and Long term goals: (See Goal Flow Sheet for this information)     Communication ability:  Patient appears to be able to clearly communicate and understand verbal and written communication and follow directions correctly.  Treatment Explanation - The following has been discussed with the patient:   RX ordered/plan of care  Anticipated outcomes  Possible risks and side effects  This patient would benefit from PT intervention to resume normal activities.   Rehab potential is excellent.    Frequency:  1 X week, once daily  Duration:  for 3 weeks  tapering to 2 X a month over 3 weeks  Discharge Plan:  Achieve all LTG.  Independent in home treatment program.  Reach maximal therapeutic benefit.    Please refer to the daily flowsheet for treatment today, total treatment time and time spent performing 1:1 timed codes.

## 2021-11-23 PROBLEM — M62.89 PELVIC FLOOR DYSFUNCTION: Status: ACTIVE | Noted: 2021-11-23

## 2021-11-23 PROBLEM — R35.0 URINARY FREQUENCY: Status: ACTIVE | Noted: 2021-11-23

## 2021-11-29 ENCOUNTER — THERAPY VISIT (OUTPATIENT)
Dept: PHYSICAL THERAPY | Facility: CLINIC | Age: 41
End: 2021-11-29
Payer: COMMERCIAL

## 2021-11-29 DIAGNOSIS — R35.0 URINARY FREQUENCY: ICD-10-CM

## 2021-11-29 DIAGNOSIS — M62.89 PELVIC FLOOR DYSFUNCTION: ICD-10-CM

## 2021-11-29 PROCEDURE — 97110 THERAPEUTIC EXERCISES: CPT | Mod: GP | Performed by: PHYSICAL THERAPIST

## 2021-11-29 PROCEDURE — 97530 THERAPEUTIC ACTIVITIES: CPT | Mod: GP | Performed by: PHYSICAL THERAPIST

## 2021-11-29 PROCEDURE — 97140 MANUAL THERAPY 1/> REGIONS: CPT | Mod: GP | Performed by: PHYSICAL THERAPIST

## 2021-12-07 ENCOUNTER — THERAPY VISIT (OUTPATIENT)
Dept: PHYSICAL THERAPY | Facility: CLINIC | Age: 41
End: 2021-12-07
Payer: COMMERCIAL

## 2021-12-07 DIAGNOSIS — R35.0 URINARY FREQUENCY: ICD-10-CM

## 2021-12-07 DIAGNOSIS — M62.89 PELVIC FLOOR DYSFUNCTION: ICD-10-CM

## 2021-12-07 PROCEDURE — 97112 NEUROMUSCULAR REEDUCATION: CPT | Mod: GP | Performed by: PHYSICAL THERAPIST

## 2021-12-07 PROCEDURE — 97530 THERAPEUTIC ACTIVITIES: CPT | Mod: GP | Performed by: PHYSICAL THERAPIST

## 2021-12-07 PROCEDURE — 97140 MANUAL THERAPY 1/> REGIONS: CPT | Mod: GP | Performed by: PHYSICAL THERAPIST

## 2021-12-08 ENCOUNTER — HOSPITAL ENCOUNTER (OUTPATIENT)
Dept: CT IMAGING | Facility: CLINIC | Age: 41
Discharge: HOME | End: 2021-12-08
Attending: STUDENT IN AN ORGANIZED HEALTH CARE EDUCATION/TRAINING PROGRAM | Admitting: STUDENT IN AN ORGANIZED HEALTH CARE EDUCATION/TRAINING PROGRAM
Payer: COMMERCIAL

## 2021-12-08 ENCOUNTER — OFFICE VISIT (OUTPATIENT)
Dept: UROLOGY | Facility: CLINIC | Age: 41
End: 2021-12-08
Payer: COMMERCIAL

## 2021-12-08 VITALS
DIASTOLIC BLOOD PRESSURE: 70 MMHG | SYSTOLIC BLOOD PRESSURE: 130 MMHG | WEIGHT: 130.07 LBS | BODY MASS INDEX: 23.05 KG/M2 | HEIGHT: 63 IN

## 2021-12-08 DIAGNOSIS — N20.0 CALCULUS OF KIDNEY: ICD-10-CM

## 2021-12-08 DIAGNOSIS — N32.81 OVERACTIVE BLADDER: ICD-10-CM

## 2021-12-08 DIAGNOSIS — Z87.442 HISTORY OF KIDNEY STONES: ICD-10-CM

## 2021-12-08 DIAGNOSIS — Z87.442 HISTORY OF KIDNEY STONES: Primary | ICD-10-CM

## 2021-12-08 LAB
ALBUMIN UR-MCNC: NEGATIVE MG/DL
APPEARANCE UR: CLEAR
BILIRUB UR QL STRIP: NEGATIVE
COLOR UR AUTO: YELLOW
GLUCOSE UR STRIP-MCNC: NEGATIVE MG/DL
HGB UR QL STRIP: NEGATIVE
KETONES UR STRIP-MCNC: NEGATIVE MG/DL
LEUKOCYTE ESTERASE UR QL STRIP: NEGATIVE
NITRATE UR QL: NEGATIVE
PH UR STRIP: 7 [PH] (ref 5–7)
SP GR UR STRIP: 1.01 (ref 1–1.03)
UROBILINOGEN UR STRIP-ACNC: 0.2 E.U./DL

## 2021-12-08 PROCEDURE — 81003 URINALYSIS AUTO W/O SCOPE: CPT | Mod: QW | Performed by: STUDENT IN AN ORGANIZED HEALTH CARE EDUCATION/TRAINING PROGRAM

## 2021-12-08 PROCEDURE — 74176 CT ABD & PELVIS W/O CONTRAST: CPT

## 2021-12-08 PROCEDURE — 99214 OFFICE O/P EST MOD 30 MIN: CPT | Performed by: STUDENT IN AN ORGANIZED HEALTH CARE EDUCATION/TRAINING PROGRAM

## 2021-12-08 RX ORDER — POTASSIUM CITRATE 10 MEQ/1
10 TABLET, EXTENDED RELEASE ORAL
Qty: 180 TABLET | Refills: 5 | Status: SHIPPED | OUTPATIENT
Start: 2021-12-08 | End: 2022-12-07

## 2021-12-08 ASSESSMENT — PAIN SCALES - GENERAL: PAINLEVEL: NO PAIN (0)

## 2021-12-08 ASSESSMENT — MIFFLIN-ST. JEOR: SCORE: 1395.13

## 2021-12-08 NOTE — PATIENT INSTRUCTIONS
Continue the pelvic floor physical therapy  Can consider stopping oxybutinin if PFPT is improving urinary symptoms  Continue potassium citrate  Return in one year with PAT bynum

## 2021-12-08 NOTE — PROGRESS NOTES
PROGRESS  REPORT    Progress reporting period is from 11/22/2021 to 12/7/2021.       SUBJECTIVE  Subjective changes noted by patient: Pt reports that he is trying to wait atleast one hour between voids by using urge suppression techniques.  He also notes sipping water throughout the day instead of drinking large amounts at once which seems to be helping with his urinary urgency and frequency.  He reports doing most of his exercises, but it is still hard for him to know if pelvic floor is able to relax.  He does note improvements with frequent recurring trips to the bathroom.  He continues to report no urinary leakage.       Current pain level is 0/10  .     Previous pain level was  0/10 Initial Pain level: 0/10.   Changes in function:  None, but patient does report less urinary frequency and intensity of urgency has decreased.   Adverse reaction to treatment or activity: None    OBJECTIVE  Changes noted in objective findings:  Yes, see below.   Objective: Upon pelvic floor examination, patient has better ability to contract pelvic floor without glute and adductor compensation.  It is still difficult to coordinate pelvic floor relaxation as seen with external muscle exam, biofeedback, and with internal pelvic floor muscle exam today.  Biofeedback showed increased resting tone of pelvic floor in supine which did decrease with internal muscle release.  Rectal assessment demonstrated TTP to R>L levator ani, iliococcygeus, and puborectalis muscles.  Pt has proper coordination for bowel movement, but does seem to hold tension in his abdomen while in seated position.  Resting PFM tone and abdominal tone were improve following manual therapy techniques.      ASSESSMENT/PLAN  Updated problem list and treatment plan: Diagnosis 1:  Pelvic floor dysfunction  Decreased ROM/flexibility - manual therapy and therapeutic exercise  Impaired muscle performance - neuro re-education  Decreased function - therapeutic activities  STG/LTGs  have been met or progress has been made towards goals:  Yes (See Goal flow sheet completed today.) and continues to require PT to meet goals.  Assessment of Progress: The patient's condition is improving.  Self Management Plans:  Patient has been instructed in a home treatment program.  I have re-evaluated this patient and find that the nature, scope, duration and intensity of the therapy is appropriate for the medical condition of the patient.  Faye continues to require the following intervention to meet STG and LTG's:  PT    Recommendations:  This patient would benefit from continued therapy.     Frequency:  2 X a month, once daily  Duration:  for 4 weeks        Please refer to the daily flowsheet for treatment today, total treatment time and time spent performing 1:1 timed codes.

## 2021-12-08 NOTE — PROGRESS NOTES
"CHIEF COMPLAINT   Faye Pugh who is a 41 year old male returns today for follow-up of OAB, nephrolithiasis.      HPI   Faye Pugh is a 41 year old male who presents with a history of OAB, nephrolithiasis.     Continued on anticholinergic and referred for PFPT. Is doing some exercises for bladder training and pelvic floor relaxation. Also modifying how he is drinking fluid.    Notices slower stream with the oxybutinin    PHYSICAL EXAM  Patient is a 41 year old  male   Vitals: Blood pressure 130/70, height 1.6 m (5' 3\"), weight 59 kg (130 lb 1.1 oz).  Body mass index is 23.04 kg/m .  General Appearance Adult:   Alert, no acute distress, oriented  HENT: throat/mouth:normal, good dentition  Lungs: no respiratory distress, or pursed lip breathing  Heart: No obvious jugular venous distension present  Abdomen: nondistended  Musculoskeltal: extremities normal, no peripheral edema  Skin: no suspicious lesions or rashes  Neuro: Alert, oriented, speech and mentation normal  Psych: affect and mood normal  Gait: Normal    All pertinent imaging reviewed:    CT abd/pelvis w/o contrast 12/8/2021    IMPRESSION: No renal, ureteral, or bladder stones.        I reviewed images personally. No urolithiasis seen    ASSESSMENT and PLAN  41 year old male who presents with a history of OAB, nephrolithiasis    No evidence of nephrolithiasis on CT scan. Doing somewhat better with urinary symptoms, undergoing PFPT    Continue the pelvic floor physical therapy  Can consider stopping oxybutinin if PFPT is improving urinary symptoms  Continue potassium citrate  Return in one year with litholink, KUB prior, with PVR and AUA symptom score      Chong Nguyen MD   [facility] Urology  [facility] Phone: [phone]    "

## 2021-12-08 NOTE — LETTER
"12/8/2021       RE: Faye Pugh  [address]  [place] [address]     Dear Colleague,    Thank you for referring your patient, Faye Pugh, to the [facility] at [facility]. Please see a copy of my visit note below.    CHIEF COMPLAINT   Faye Pugh who is a 41 year old male returns today for follow-up of OAB, nephrolithiasis.      HPI   Faye Pugh is a 41 year old male who presents with a history of OAB, nephrolithiasis.     Continued on anticholinergic and referred for PFPT. Is doing some exercises for bladder training and pelvic floor relaxation. Also modifying how he is drinking fluid.    Notices slower stream with the oxybutinin    PHYSICAL EXAM  Patient is a 41 year old  male   Vitals: Blood pressure 130/70, height 1.6 m (5' 3\"), weight 59 kg (130 lb 1.1 oz).  Body mass index is 23.04 kg/m .  General Appearance Adult:   Alert, no acute distress, oriented  HENT: throat/mouth:normal, good dentition  Lungs: no respiratory distress, or pursed lip breathing  Heart: No obvious jugular venous distension present  Abdomen: nondistended  Musculoskeltal: extremities normal, no peripheral edema  Skin: no suspicious lesions or rashes  Neuro: Alert, oriented, speech and mentation normal  Psych: affect and mood normal  Gait: Normal    All pertinent imaging reviewed:    CT abd/pelvis w/o contrast 12/8/2021    IMPRESSION: No renal, ureteral, or bladder stones.        I reviewed images personally. No urolithiasis seen    ASSESSMENT and PLAN  41 year old male who presents with a history of OAB, nephrolithiasis    No evidence of nephrolithiasis on CT scan. Doing somewhat better with urinary symptoms, undergoing PFPT    Continue the pelvic floor physical therapy  Can consider stopping oxybutinin if PFPT is improving urinary symptoms  Continue potassium citrate  Return in one year with PAT bynum prior, with PVR and AUA symptom score      Chong SANTOS" MD Wendy   [facility] Urology  [facility] Phone: [phone]

## 2021-12-27 ENCOUNTER — THERAPY VISIT (OUTPATIENT)
Dept: PHYSICAL THERAPY | Facility: CLINIC | Age: 41
End: 2021-12-27
Payer: COMMERCIAL

## 2021-12-27 DIAGNOSIS — R35.0 URINARY FREQUENCY: ICD-10-CM

## 2021-12-27 DIAGNOSIS — M62.89 PELVIC FLOOR DYSFUNCTION: ICD-10-CM

## 2021-12-27 PROCEDURE — 97140 MANUAL THERAPY 1/> REGIONS: CPT | Mod: GP | Performed by: PHYSICAL THERAPIST

## 2021-12-27 PROCEDURE — 97110 THERAPEUTIC EXERCISES: CPT | Mod: GP | Performed by: PHYSICAL THERAPIST

## 2021-12-27 PROCEDURE — 97530 THERAPEUTIC ACTIVITIES: CPT | Mod: GP | Performed by: PHYSICAL THERAPIST

## 2022-05-21 ENCOUNTER — HEALTH MAINTENANCE LETTER (OUTPATIENT)
Age: 42
End: 2022-05-21

## 2022-08-18 DIAGNOSIS — N20.0 KIDNEY STONE: Primary | ICD-10-CM

## 2022-09-17 ENCOUNTER — HEALTH MAINTENANCE LETTER (OUTPATIENT)
Age: 42
End: 2022-09-17

## 2022-11-21 ENCOUNTER — TRANSFERRED RECORDS (OUTPATIENT)
Dept: HEALTH INFORMATION MANAGEMENT | Facility: CLINIC | Age: 42
End: 2022-11-21

## 2022-12-07 ENCOUNTER — OFFICE VISIT (OUTPATIENT)
Dept: UROLOGY | Facility: CLINIC | Age: 42
End: 2022-12-07
Payer: COMMERCIAL

## 2022-12-07 ENCOUNTER — ANCILLARY PROCEDURE (OUTPATIENT)
Dept: GENERAL RADIOLOGY | Facility: CLINIC | Age: 42
End: 2022-12-07
Attending: STUDENT IN AN ORGANIZED HEALTH CARE EDUCATION/TRAINING PROGRAM
Payer: COMMERCIAL

## 2022-12-07 VITALS
BODY MASS INDEX: 22.15 KG/M2 | SYSTOLIC BLOOD PRESSURE: 112 MMHG | HEIGHT: 63 IN | HEART RATE: 80 BPM | WEIGHT: 125 LBS | DIASTOLIC BLOOD PRESSURE: 70 MMHG

## 2022-12-07 DIAGNOSIS — N20.0 CALCULUS OF KIDNEY: Primary | ICD-10-CM

## 2022-12-07 DIAGNOSIS — N20.0 KIDNEY STONE: ICD-10-CM

## 2022-12-07 DIAGNOSIS — R82.991 HYPOCITRATURIA: ICD-10-CM

## 2022-12-07 DIAGNOSIS — R82.993 HYPERURICOSURIA: ICD-10-CM

## 2022-12-07 LAB
ALBUMIN UR-MCNC: NEGATIVE MG/DL
APPEARANCE UR: CLEAR
BILIRUB UR QL STRIP: NEGATIVE
COLOR UR AUTO: YELLOW
GLUCOSE UR STRIP-MCNC: NEGATIVE MG/DL
HGB UR QL STRIP: NEGATIVE
KETONES UR STRIP-MCNC: NEGATIVE MG/DL
LEUKOCYTE ESTERASE UR QL STRIP: NEGATIVE
NITRATE UR QL: NEGATIVE
PH UR STRIP: 6.5 [PH] (ref 5–7)
SP GR UR STRIP: 1.02 (ref 1–1.03)
UROBILINOGEN UR STRIP-ACNC: 0.2 E.U./DL

## 2022-12-07 PROCEDURE — 99214 OFFICE O/P EST MOD 30 MIN: CPT | Performed by: STUDENT IN AN ORGANIZED HEALTH CARE EDUCATION/TRAINING PROGRAM

## 2022-12-07 PROCEDURE — 74019 RADEX ABDOMEN 2 VIEWS: CPT

## 2022-12-07 PROCEDURE — 81003 URINALYSIS AUTO W/O SCOPE: CPT | Mod: QW | Performed by: STUDENT IN AN ORGANIZED HEALTH CARE EDUCATION/TRAINING PROGRAM

## 2022-12-07 RX ORDER — MODAFINIL 200 MG/1
TABLET ORAL
COMMUNITY
Start: 2022-12-04

## 2022-12-07 RX ORDER — POTASSIUM CITRATE 10 MEQ/1
10 TABLET, EXTENDED RELEASE ORAL
Qty: 180 TABLET | Refills: 5 | Status: SHIPPED | OUTPATIENT
Start: 2022-12-07 | End: 2024-01-17

## 2022-12-07 ASSESSMENT — PAIN SCALES - GENERAL: PAINLEVEL: NO PAIN (0)

## 2022-12-07 NOTE — PROGRESS NOTES
CHIEF COMPLAINT   Faye Pugh who is a 42 year old male returns today for follow-up of OAB, nephrolithiasis.      HPI   Faye Pugh is a 42 year old male who presents with a history of OAB, nephrolithiasis.      Last seen a year ago. He was continued on PFPT for his OAB and recommended consider stopping PFPT. He stopped oxybutinin because his symptoms had improved    Re: nephrolithiasis he was continued on potassium citrate        PHYSICAL EXAM  Patient is a 42 year old  male   Vitals: There were no vitals taken for this visit.  There is no height or weight on file to calculate BMI.  General Appearance Adult:   Alert, no acute distress, oriented  HENT: throat/mouth:normal, good dentition  Lungs: no respiratory distress, or pursed lip breathing  Heart: No obvious jugular venous distension present  Abdomen: nondistended  Musculoskeltal: extremities normal, no peripheral edema  Skin: no suspicious lesions or rashes  Neuro: Alert, oriented, speech and mentation normal  Psych: affect and mood normal  Gait: Normal  : deferred    All pertinent imaging reviewed:    kub 12/7/2022      Agree with rounded radiodensity over the right renal shadow, possible renal stone.            ASSESSMENT and PLAN  42 year old male who presents with a history of OAB, nephrolithiasis.    OAB symptoms have largely resolved after PFPT, he has stopped oxybutinin    litholink reviewed. 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 Urine volume is good. Will keep at same dose of potassium citrate for now. His urine uric acid is elevated and his serum uric acid in July was also borderline elevated. Will refer to nephrology for assistance. For now, get repeat litholink in 6 months. Also he should eat less protein to reduce the uric acid    Possible right kidney stone on kub. Asymptomatic. Will get CT abd/pelvis w/o contrast in 6 months unless started having symptoms    - referral to nephrology  - return 6 months with ct abd/pelvis w/o contrast, litholink  prior    Chong Nguyen MD   [facility] Urology  [facility] Phone: [phone]

## 2022-12-07 NOTE — LETTER
12/7/2022       RE: Faye Pugh  [address]  [place] [address]     Dear Colleague,    Thank you for referring your patient, Faye Pugh, to the [facility] at [facility]. Please see a copy of my visit note below.    CHIEF COMPLAINT   Faye Pugh who is a 42 year old male returns today for follow-up of OAB, nephrolithiasis.      HPI   Faye Pugh is a 42 year old male who presents with a history of OAB, nephrolithiasis.      Last seen a year ago. He was continued on PFPT for his OAB and recommended consider stopping PFPT. He stopped oxybutinin because his symptoms had improved    Re: nephrolithiasis he was continued on potassium citrate        PHYSICAL EXAM  Patient is a 42 year old  male   Vitals: There were no vitals taken for this visit.  There is no height or weight on file to calculate BMI.  General Appearance Adult:   Alert, no acute distress, oriented  HENT: throat/mouth:normal, good dentition  Lungs: no respiratory distress, or pursed lip breathing  Heart: No obvious jugular venous distension present  Abdomen: nondistended  Musculoskeltal: extremities normal, no peripheral edema  Skin: no suspicious lesions or rashes  Neuro: Alert, oriented, speech and mentation normal  Psych: affect and mood normal  Gait: Normal  : deferred    All pertinent imaging reviewed:    kub 12/7/2022      Agree with rounded radiodensity over the right renal shadow, possible renal stone.            ASSESSMENT and PLAN  42 year old male who presents with a history of OAB, nephrolithiasis.    OAB symptoms have largely resolved after PFPT, he has stopped oxybutinin    litholink reviewed. 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 Urine volume is good. Will keep at same dose of potassium citrate for now. His urine uric acid is elevated and his serum uric acid in July was also borderline elevated. Will refer to nephrology for assistance. For now, get repeat litholink in 6  months. Also he should eat less protein to reduce the uric acid    Possible right kidney stone on kub. Asymptomatic. Will get CT abd/pelvis w/o contrast in 6 months unless started having symptoms    - referral to nephrology  - return 6 months with ct abd/pelvis w/o contrast, fletcher prior    Chong Nguyen MD   [facility] Urology  [facility] Phone: [phone]

## 2022-12-07 NOTE — PATIENT INSTRUCTIONS
Please see Dr. Marky Patterson a nephrologist at [facility] for medical stone prevention. Call the number below to make an appointment    [phone]      Continue your potassium citrate

## 2023-05-26 ENCOUNTER — TRANSFERRED RECORDS (OUTPATIENT)
Dept: HEALTH INFORMATION MANAGEMENT | Facility: CLINIC | Age: 43
End: 2023-05-26
Payer: COMMERCIAL

## 2023-06-12 ENCOUNTER — OFFICE VISIT (OUTPATIENT)
Dept: UROLOGY | Facility: CLINIC | Age: 43
End: 2023-06-12
Payer: COMMERCIAL

## 2023-06-12 ENCOUNTER — ANCILLARY PROCEDURE (OUTPATIENT)
Dept: CT IMAGING | Facility: CLINIC | Age: 43
End: 2023-06-12
Attending: STUDENT IN AN ORGANIZED HEALTH CARE EDUCATION/TRAINING PROGRAM
Payer: COMMERCIAL

## 2023-06-12 VITALS
WEIGHT: 132 LBS | DIASTOLIC BLOOD PRESSURE: 86 MMHG | HEART RATE: 80 BPM | HEIGHT: 63 IN | OXYGEN SATURATION: 98 % | SYSTOLIC BLOOD PRESSURE: 133 MMHG | BODY MASS INDEX: 23.39 KG/M2

## 2023-06-12 DIAGNOSIS — N32.81 OVERACTIVE BLADDER: ICD-10-CM

## 2023-06-12 DIAGNOSIS — N20.0 CALCULUS OF KIDNEY: Primary | ICD-10-CM

## 2023-06-12 DIAGNOSIS — R82.993 HYPERURICOSURIA: ICD-10-CM

## 2023-06-12 DIAGNOSIS — R82.994 HYPERCALCIURIA: ICD-10-CM

## 2023-06-12 DIAGNOSIS — N20.0 CALCULUS OF KIDNEY: ICD-10-CM

## 2023-06-12 PROCEDURE — 99213 OFFICE O/P EST LOW 20 MIN: CPT | Performed by: STUDENT IN AN ORGANIZED HEALTH CARE EDUCATION/TRAINING PROGRAM

## 2023-06-12 PROCEDURE — 74176 CT ABD & PELVIS W/O CONTRAST: CPT

## 2023-06-12 ASSESSMENT — PAIN SCALES - GENERAL: PAINLEVEL: NO PAIN (0)

## 2023-06-12 NOTE — PROGRESS NOTES
"CHIEF COMPLAINT   Faye Pugh who is a 43 year old male returns today for follow-up of OAB, nephrolithiasis.      HPI   Faye Pugh is a 43 year old male returns today for follow-up of OAB, nephrolithiasis    He was seen by nephrology in January 2023, increased K citrate to 20meq tid, fluid intake goals discussed, also dietary changes reducing meat    Denies stone symptoms    OAB symptoms resolved after PFPT    PHYSICAL EXAM  Patient is a 43 year old  male   Vitals: Blood pressure 133/86, pulse 80, height 1.6 m (5' 3\"), weight 59.9 kg (132 lb), SpO2 98 %.  Body mass index is 23.38 kg/m .  General Appearance Adult:   Alert, no acute distress, oriented  HENT: throat/mouth:normal, good dentition  Lungs: no respiratory distress, or pursed lip breathing  Heart: No obvious jugular venous distension present  Abdomen: nondistended  Musculoskeltal: extremities normal, no peripheral edema  Skin: no suspicious lesions or rashes  Neuro: Alert, oriented, speech and mentation normal  Psych: affect and mood normal  Gait: Normal  : deferred    All pertinent imaging reviewed:    I reviewed CT 6/12/2023. Formal report pending. I see no nephrolithiasis          ASSESSMENT and PLAN  43 year old male returns today for follow-up of OAB, nephrolithiasis    Re: OAB, resolved after PFPT, no further intervention    Re: nephrolithiasis, no obvious stone on CT. Hasn't had a symptomatic stone since 2017 despite significant risk factors on 24 hour urine collection. Reviewed most recent litholink with patient. Good urine output. Hypercalciuria returning. Still with high uric acid. We will keep him on K citrate 20meq tid since he is tolerating. Will try to avoid excessive radiation so will do renal ultrasound at next visit in a year    - continue k citrate 20 meq tid  - return 1 year with renal ultrasound prior      Chong Nguyen MD   [facility] Urology  [facility] Phone: [phone]    "

## 2023-06-12 NOTE — LETTER
"6/12/2023       RE: Faye Pugh  [address] [place] [address]     Dear Colleague,    Thank you for referring your patient, Faye Pugh, to the [facility] at [facility]. Please see a copy of my visit note below.    CHIEF COMPLAINT   Faye Pugh who is a 43 year old male returns today for follow-up of OAB, nephrolithiasis.      HPI   Faye Pugh is a 43 year old male returns today for follow-up of OAB, nephrolithiasis    He was seen by nephrology in January 2023, increased K citrate to 20meq tid, fluid intake goals discussed, also dietary changes reducing meat    Denies stone symptoms    OAB symptoms resolved after PFPT    PHYSICAL EXAM  Patient is a 43 year old  male   Vitals: Blood pressure 133/86, pulse 80, height 1.6 m (5' 3\"), weight 59.9 kg (132 lb), SpO2 98 %.  Body mass index is 23.38 kg/m .  General Appearance Adult:   Alert, no acute distress, oriented  HENT: throat/mouth:normal, good dentition  Lungs: no respiratory distress, or pursed lip breathing  Heart: No obvious jugular venous distension present  Abdomen: nondistended  Musculoskeltal: extremities normal, no peripheral edema  Skin: no suspicious lesions or rashes  Neuro: Alert, oriented, speech and mentation normal  Psych: affect and mood normal  Gait: Normal  : deferred    All pertinent imaging reviewed:    I reviewed CT 6/12/2023. Formal report pending. I see no nephrolithiasis          ASSESSMENT and PLAN  43 year old male returns today for follow-up of OAB, nephrolithiasis    Re: OAB, resolved after PFPT, no further intervention    Re: nephrolithiasis, no obvious stone on CT. Hasn't had a symptomatic stone since 2017 despite significant risk factors on 24 hour urine collection. Reviewed most recent litholink with patient. Good urine output. Hypercalciuria returning. Still with high uric acid. We will keep him on K citrate 20meq tid since he is tolerating. Will try " to avoid excessive radiation so will do renal ultrasound at next visit in a year    - continue k citrate 20 meq tid  - return 1 year with renal ultrasound prior      Chong Nguyen MD   [facility] Urology  [facility] Phone: [phone]

## 2023-06-12 NOTE — NURSING NOTE
Chief Complaint   Patient presents with     kidney stones     Follow Up     Ct done today 06-   talk about ct results today   Everything is well pr patient  Anthony Sadler, clinic assistant also info of the litholink too.

## 2023-10-07 ENCOUNTER — HEALTH MAINTENANCE LETTER (OUTPATIENT)
Age: 43
End: 2023-10-07

## 2024-01-17 ENCOUNTER — MYC REFILL (OUTPATIENT)
Dept: UROLOGY | Facility: CLINIC | Age: 44
End: 2024-01-17
Payer: COMMERCIAL

## 2024-01-17 DIAGNOSIS — N20.0 CALCULUS OF KIDNEY: ICD-10-CM

## 2024-01-18 RX ORDER — POTASSIUM CITRATE 10 MEQ/1
10 TABLET, EXTENDED RELEASE ORAL
Qty: 180 TABLET | Refills: 5 | Status: SHIPPED | OUTPATIENT
Start: 2024-01-18 | End: 2024-01-23

## 2024-01-23 ENCOUNTER — MYC REFILL (OUTPATIENT)
Dept: UROLOGY | Facility: CLINIC | Age: 44
End: 2024-01-23
Payer: COMMERCIAL

## 2024-01-23 DIAGNOSIS — N20.0 CALCULUS OF KIDNEY: ICD-10-CM

## 2024-01-23 RX ORDER — POTASSIUM CITRATE 10 MEQ/1
10 TABLET, EXTENDED RELEASE ORAL
Qty: 270 TABLET | Refills: 2 | Status: SHIPPED | OUTPATIENT
Start: 2024-01-23 | End: 2024-06-03

## 2024-06-03 ENCOUNTER — OFFICE VISIT (OUTPATIENT)
Dept: UROLOGY | Facility: CLINIC | Age: 44
End: 2024-06-03
Payer: COMMERCIAL

## 2024-06-03 ENCOUNTER — ANCILLARY PROCEDURE (OUTPATIENT)
Dept: ULTRASOUND IMAGING | Facility: CLINIC | Age: 44
End: 2024-06-03
Attending: STUDENT IN AN ORGANIZED HEALTH CARE EDUCATION/TRAINING PROGRAM
Payer: COMMERCIAL

## 2024-06-03 VITALS
SYSTOLIC BLOOD PRESSURE: 119 MMHG | HEIGHT: 63 IN | DIASTOLIC BLOOD PRESSURE: 84 MMHG | WEIGHT: 135 LBS | BODY MASS INDEX: 23.92 KG/M2 | HEART RATE: 82 BPM | OXYGEN SATURATION: 97 %

## 2024-06-03 DIAGNOSIS — N20.0 CALCULUS OF KIDNEY: ICD-10-CM

## 2024-06-03 DIAGNOSIS — R82.993 HYPERURICOSURIA: ICD-10-CM

## 2024-06-03 DIAGNOSIS — N32.81 OVERACTIVE BLADDER: ICD-10-CM

## 2024-06-03 DIAGNOSIS — N20.0 CALCULUS OF KIDNEY: Primary | ICD-10-CM

## 2024-06-03 DIAGNOSIS — R82.994 HYPERCALCIURIA: ICD-10-CM

## 2024-06-03 PROCEDURE — 81003 URINALYSIS AUTO W/O SCOPE: CPT | Mod: QW | Performed by: STUDENT IN AN ORGANIZED HEALTH CARE EDUCATION/TRAINING PROGRAM

## 2024-06-03 PROCEDURE — 99213 OFFICE O/P EST LOW 20 MIN: CPT | Performed by: STUDENT IN AN ORGANIZED HEALTH CARE EDUCATION/TRAINING PROGRAM

## 2024-06-03 PROCEDURE — 76770 US EXAM ABDO BACK WALL COMP: CPT

## 2024-06-03 RX ORDER — POTASSIUM CITRATE 10 MEQ/1
20 TABLET, EXTENDED RELEASE ORAL
Qty: 360 TABLET | Refills: 5 | Status: SHIPPED | OUTPATIENT
Start: 2024-06-03

## 2024-06-03 ASSESSMENT — PAIN SCALES - GENERAL: PAINLEVEL: NO PAIN (0)

## 2024-06-03 NOTE — NURSING NOTE
Chief Complaint   Patient presents with    hx kidney stones     Ultrasound done 06-    Talk about the ultrasound today   Everything is going well   Anthony Sadler, CMA

## 2024-06-03 NOTE — PROGRESS NOTES
"CHIEF COMPLAINT   Faye Pugh who is a 44 year old male returns today for follow-up of OAB, nephrolithiasis.  .      HPI   Faye Pugh is a 44 year old male returns today for follow-up of OAB, nephrolithiasis.      OAB resolved    He continues on potassium citrate 20 meq three times a day without issue    PHYSICAL EXAM  Patient is a 44 year old  male   Vitals: Blood pressure 119/84, pulse 82, height 1.6 m (5' 3\"), weight 61.2 kg (135 lb), SpO2 97%.  Body mass index is 23.91 kg/m .  General Appearance Adult:   Alert, no acute distress, oriented  HENT: throat/mouth:normal, good dentition  Lungs: no respiratory distress, or pursed lip breathing  Heart: No obvious jugular venous distension present  Abdomen: nondistended  Musculoskeltal: extremities normal, no peripheral edema  Skin: no suspicious lesions or rashes  Neuro: Alert, oriented, speech and mentation normal  Psych: affect and mood normal      Component      Latest Ref Rng 6/3/2024  3:31 PM   Color Urine      Colorless, Straw, Light Yellow, Yellow  Yellow    Appearance Urine      Clear  Clear    Glucose Urine      Negative mg/dL Negative    Bilirubin Urine      Negative  Negative    Ketones Urine      Negative mg/dL Negative    Specific Gravity Urine      1.003 - 1.035  1.015    Blood Urine      Negative  Negative    pH Urine      5.0 - 7.0  7.0    Protein Albumin Urine      Negative mg/dL Negative    Urobilinogen Urine      0.2, 1.0 E.U./dL 0.2    Nitrite Urine      Negative  Negative    Leukocyte Esterase Urine      Negative  Negative        All pertinent imaging reviewed:    Renal ultrasound 6/3/2024    IMPRESSION:  1.  Normal kidney ultrasound.    ASSESSMENT and PLAN  44 year old male returns today for follow-up of OAB, nephrolithiasis.      Re: OAB, resolved. UA unremarkable    Re: nephrolithiasis: He has not had a stone since 2017. Renal ultrasound today good. Recommend he continues on potassium citrate indefinitely given his issues with hypeuricosuria and " hypercalciuria. He can have this refilled by his PCP. Can recheck a CT abd/pelvis w/o contrast every few years with PCP and return if any new stones develop or becomes symptomatic        Chong Nguyen MD   [facility]  [facility] Phone: [phone]

## 2024-06-03 NOTE — LETTER
"6/3/2024       RE: Faye Pugh  [address]  [place] [address]     Dear Colleague,    Thank you for referring your patient, Faye Pugh, to the [facility] at [facility]. Please see a copy of my visit note below.    CHIEF COMPLAINT   Faye Pugh who is a 44 year old male returns today for follow-up of OAB, nephrolithiasis.  .      HPI   Faye Pugh is a 44 year old male returns today for follow-up of OAB, nephrolithiasis.      OAB resolved    He continues on potassium citrate 20 meq three times a day without issue    PHYSICAL EXAM  Patient is a 44 year old  male   Vitals: Blood pressure 119/84, pulse 82, height 1.6 m (5' 3\"), weight 61.2 kg (135 lb), SpO2 97%.  Body mass index is 23.91 kg/m .  General Appearance Adult:   Alert, no acute distress, oriented  HENT: throat/mouth:normal, good dentition  Lungs: no respiratory distress, or pursed lip breathing  Heart: No obvious jugular venous distension present  Abdomen: nondistended  Musculoskeltal: extremities normal, no peripheral edema  Skin: no suspicious lesions or rashes  Neuro: Alert, oriented, speech and mentation normal  Psych: affect and mood normal      Component      Latest Ref Rng 6/3/2024  3:31 PM   Color Urine      Colorless, Straw, Light Yellow, Yellow  Yellow    Appearance Urine      Clear  Clear    Glucose Urine      Negative mg/dL Negative    Bilirubin Urine      Negative  Negative    Ketones Urine      Negative mg/dL Negative    Specific Gravity Urine      1.003 - 1.035  1.015    Blood Urine      Negative  Negative    pH Urine      5.0 - 7.0  7.0    Protein Albumin Urine      Negative mg/dL Negative    Urobilinogen Urine      0.2, 1.0 E.U./dL 0.2    Nitrite Urine      Negative  Negative    Leukocyte Esterase Urine      Negative  Negative        All pertinent imaging reviewed:    Renal ultrasound 6/3/2024    IMPRESSION:  1.  Normal kidney ultrasound.    ASSESSMENT and " PLAN  44 year old male returns today for follow-up of OAB, nephrolithiasis.      Re: OAB, resolved. UA unremarkable    Re: nephrolithiasis: He has not had a stone since 2017. Renal ultrasound today good. Recommend he continues on potassium citrate indefinitely given his issues with hypeuricosuria and hypercalciuria. He can have this refilled by his PCP. Can recheck a CT abd/pelvis w/o contrast every few years with PCP and return if any new stones develop or becomes symptomatic        Chong Nguyen MD   [facility]  [facility] Phone: [phone]

## 2025-07-19 ENCOUNTER — HEALTH MAINTENANCE LETTER (OUTPATIENT)
Age: 45
End: 2025-07-19

## (undated) DEVICE — GLOVE PROTEXIS W/NEU-THERA 6.5  2D73TE65

## (undated) DEVICE — CATH URETERAL OPEN END 6FR AXXCESS

## (undated) DEVICE — SOL NACL 0.9% IRRIG 3000ML BAG 2B7477

## (undated) DEVICE — RAD RX ISOVUE 300 (50ML) 61% IOPAMIDOL CHARGE PER ML

## (undated) DEVICE — PACK CYSTOSCOPY SBA15CYFSI

## (undated) DEVICE — PAD CHUX UNDERPAD 23X24" 7136

## (undated) DEVICE — LINEN TOWEL PACK X5 5464

## (undated) DEVICE — Device

## (undated) DEVICE — CATH URETERAL DL 6FR FLEX-TIP 10FRX50CM G17323 AQ-022610

## (undated) DEVICE — TUBING SUCTION 12"X1/4" N612

## (undated) DEVICE — BASKET NITINOL TIPLESS HALO  1.5FRX120CM 554120

## (undated) DEVICE — GUIDEWIRE URO SOLO FLEX STR 0.035"X150CM HW35FS

## (undated) DEVICE — LASER FIBER HOLMIUM FLEXIVA 200UM M0068403910 840-391

## (undated) DEVICE — DRAPE GYN/UROLOGY FLUID POUCH TUR 29455

## (undated) RX ORDER — LIDOCAINE HYDROCHLORIDE 20 MG/ML
INJECTION, SOLUTION EPIDURAL; INFILTRATION; INTRACAUDAL; PERINEURAL
Status: DISPENSED
Start: 2017-07-06

## (undated) RX ORDER — ONDANSETRON 2 MG/ML
INJECTION INTRAMUSCULAR; INTRAVENOUS
Status: DISPENSED
Start: 2017-07-06

## (undated) RX ORDER — FENTANYL CITRATE 50 UG/ML
INJECTION, SOLUTION INTRAMUSCULAR; INTRAVENOUS
Status: DISPENSED
Start: 2017-06-15

## (undated) RX ORDER — FENTANYL CITRATE 50 UG/ML
INJECTION, SOLUTION INTRAMUSCULAR; INTRAVENOUS
Status: DISPENSED
Start: 2017-07-06

## (undated) RX ORDER — PROPOFOL 10 MG/ML
INJECTION, EMULSION INTRAVENOUS
Status: DISPENSED
Start: 2017-07-06

## (undated) RX ORDER — ONDANSETRON 2 MG/ML
INJECTION INTRAMUSCULAR; INTRAVENOUS
Status: DISPENSED
Start: 2017-06-15

## (undated) RX ORDER — PROPOFOL 10 MG/ML
INJECTION, EMULSION INTRAVENOUS
Status: DISPENSED
Start: 2017-06-15

## (undated) RX ORDER — DEXAMETHASONE SODIUM PHOSPHATE 4 MG/ML
INJECTION, SOLUTION INTRA-ARTICULAR; INTRALESIONAL; INTRAMUSCULAR; INTRAVENOUS; SOFT TISSUE
Status: DISPENSED
Start: 2017-07-06

## (undated) RX ORDER — LIDOCAINE HYDROCHLORIDE 20 MG/ML
INJECTION, SOLUTION EPIDURAL; INFILTRATION; INTRACAUDAL; PERINEURAL
Status: DISPENSED
Start: 2017-06-15

## (undated) RX ORDER — CEFAZOLIN SODIUM 2 G/100ML
INJECTION, SOLUTION INTRAVENOUS
Status: DISPENSED
Start: 2017-07-06

## (undated) RX ORDER — CEFAZOLIN SODIUM 2 G/100ML
INJECTION, SOLUTION INTRAVENOUS
Status: DISPENSED
Start: 2017-06-15